# Patient Record
Sex: FEMALE | Race: WHITE | NOT HISPANIC OR LATINO | Employment: FULL TIME | URBAN - METROPOLITAN AREA
[De-identification: names, ages, dates, MRNs, and addresses within clinical notes are randomized per-mention and may not be internally consistent; named-entity substitution may affect disease eponyms.]

---

## 2024-05-09 ENCOUNTER — HOSPITAL ENCOUNTER (OUTPATIENT)
Dept: MRI IMAGING | Facility: HOSPITAL | Age: 59
Discharge: HOME | End: 2024-05-09
Admitting: PLASTIC SURGERY
Payer: COMMERCIAL

## 2024-05-09 DIAGNOSIS — Z90.13 STATUS POST BILATERAL MASTECTOMY: ICD-10-CM

## 2024-05-09 DIAGNOSIS — Z85.3 PERSONAL HISTORY OF BREAST CANCER: ICD-10-CM

## 2024-05-09 DIAGNOSIS — N64.9 DISORDER OF BREAST: ICD-10-CM

## 2024-05-09 LAB — CREAT BLDA-MCNC: 0.8 MG/DL (ref 0.6–1.3)

## 2024-05-09 PROCEDURE — 82565 ASSAY OF CREATININE: CPT

## 2024-05-09 PROCEDURE — A9577 INJ MULTIHANCE: HCPCS | Performed by: PLASTIC SURGERY

## 2024-05-09 PROCEDURE — 0 GADOBENATE DIMEGLUMINE 529 MG/ML SOLUTION: Performed by: PLASTIC SURGERY

## 2024-05-09 PROCEDURE — 77049 MRI BREAST C-+ W/CAD BI: CPT

## 2024-05-09 RX ADMIN — GADOBENATE DIMEGLUMINE 14 ML: 529 INJECTION, SOLUTION INTRAVENOUS at 08:43

## 2024-07-08 ENCOUNTER — PRE-ADMISSION TESTING (OUTPATIENT)
Dept: PREADMISSION TESTING | Facility: HOSPITAL | Age: 59
End: 2024-07-08

## 2024-07-08 VITALS — BODY MASS INDEX: 27.29 KG/M2 | WEIGHT: 163.8 LBS | HEIGHT: 65 IN

## 2024-07-08 LAB
ANION GAP SERPL CALCULATED.3IONS-SCNC: 6 MMOL/L (ref 5–15)
BUN SERPL-MCNC: 11 MG/DL (ref 8–23)
BUN/CREAT SERPL: 14.5 (ref 7–25)
CALCIUM SPEC-SCNC: 9.3 MG/DL (ref 8.6–10.5)
CHLORIDE SERPL-SCNC: 103 MMOL/L (ref 98–107)
CO2 SERPL-SCNC: 30 MMOL/L (ref 22–29)
CREAT SERPL-MCNC: 0.76 MG/DL (ref 0.57–1)
DEPRECATED RDW RBC AUTO: 45 FL (ref 37–54)
EGFRCR SERPLBLD CKD-EPI 2021: 89.8 ML/MIN/1.73
ERYTHROCYTE [DISTWIDTH] IN BLOOD BY AUTOMATED COUNT: 13.2 % (ref 12.3–15.4)
GLUCOSE SERPL-MCNC: 108 MG/DL (ref 65–99)
HBA1C MFR BLD: 5.2 % (ref 4.8–5.6)
HCT VFR BLD AUTO: 39.3 % (ref 34–46.6)
HGB BLD-MCNC: 12.6 G/DL (ref 12–15.9)
MCH RBC QN AUTO: 29.2 PG (ref 26.6–33)
MCHC RBC AUTO-ENTMCNC: 32.1 G/DL (ref 31.5–35.7)
MCV RBC AUTO: 91.2 FL (ref 79–97)
PLATELET # BLD AUTO: 268 10*3/MM3 (ref 140–450)
PMV BLD AUTO: 8.9 FL (ref 6–12)
POTASSIUM SERPL-SCNC: 4.8 MMOL/L (ref 3.5–5.2)
RBC # BLD AUTO: 4.31 10*6/MM3 (ref 3.77–5.28)
SODIUM SERPL-SCNC: 139 MMOL/L (ref 136–145)
WBC NRBC COR # BLD AUTO: 4.95 10*3/MM3 (ref 3.4–10.8)

## 2024-07-08 PROCEDURE — 83036 HEMOGLOBIN GLYCOSYLATED A1C: CPT

## 2024-07-08 PROCEDURE — 80048 BASIC METABOLIC PNL TOTAL CA: CPT

## 2024-07-08 PROCEDURE — 36415 COLL VENOUS BLD VENIPUNCTURE: CPT

## 2024-07-08 PROCEDURE — 85027 COMPLETE CBC AUTOMATED: CPT

## 2024-07-08 RX ORDER — ROSUVASTATIN CALCIUM 5 MG/1
5 TABLET, COATED ORAL DAILY
COMMUNITY

## 2024-07-08 RX ORDER — ACYCLOVIR 800 MG/1
800 TABLET ORAL 2 TIMES DAILY
COMMUNITY

## 2024-07-08 RX ORDER — ASPIRIN 81 MG/1
81 TABLET ORAL DAILY
COMMUNITY
End: 2024-07-15 | Stop reason: HOSPADM

## 2024-07-08 RX ORDER — IMIPRAMINE HCL 25 MG
50 TABLET ORAL NIGHTLY
COMMUNITY

## 2024-07-08 NOTE — PAT
Patient to apply Chlorhexadine wipes  to surgical area (as instructed) the night before procedure and the AM of procedure. Wipes provided.    Patient instructed to drink 20 ounces of Gatorade or Gatorlyte (if diabetic) and it needs to be completed 1 hour (for Main OR patients) or 2 hours (scheduled  section & [facility] patients) before given arrival time for procedure (NO RED Gatorade and NO Gatorade Zero).    Patient verbalized understanding.    Pt seeing cardiology later today for EKG and clearance letter- pt informed to fax and get personal copy for dos if dont receive in time- holding chart at this time till receive.

## 2024-07-14 ENCOUNTER — ANESTHESIA EVENT (OUTPATIENT)
Dept: PERIOP | Facility: HOSPITAL | Age: 59
End: 2024-07-14
Payer: COMMERCIAL

## 2024-07-14 RX ORDER — SODIUM CHLORIDE 9 MG/ML
40 INJECTION, SOLUTION INTRAVENOUS AS NEEDED
Status: CANCELLED | OUTPATIENT
Start: 2024-07-14

## 2024-07-14 RX ORDER — SODIUM CHLORIDE 0.9 % (FLUSH) 0.9 %
10 SYRINGE (ML) INJECTION AS NEEDED
Status: CANCELLED | OUTPATIENT
Start: 2024-07-14

## 2024-07-14 RX ORDER — FAMOTIDINE 10 MG/ML
20 INJECTION, SOLUTION INTRAVENOUS ONCE
Status: CANCELLED | OUTPATIENT
Start: 2024-07-14 | End: 2024-07-14

## 2024-07-15 ENCOUNTER — HOSPITAL ENCOUNTER (OUTPATIENT)
Facility: HOSPITAL | Age: 59
Setting detail: HOSPITAL OUTPATIENT SURGERY
Discharge: HOME | End: 2024-07-15
Attending: PLASTIC SURGERY | Admitting: PLASTIC SURGERY
Payer: COMMERCIAL

## 2024-07-15 ENCOUNTER — ANESTHESIA EVENT CONVERTED (OUTPATIENT)
Dept: ANESTHESIOLOGY | Facility: HOSPITAL | Age: 59
End: 2024-07-15
Payer: COMMERCIAL

## 2024-07-15 ENCOUNTER — ANESTHESIA (OUTPATIENT)
Dept: PERIOP | Facility: HOSPITAL | Age: 59
End: 2024-07-15
Payer: COMMERCIAL

## 2024-07-15 VITALS
SYSTOLIC BLOOD PRESSURE: 118 MMHG | TEMPERATURE: 97.7 F | DIASTOLIC BLOOD PRESSURE: 83 MMHG | RESPIRATION RATE: 18 BRPM | OXYGEN SATURATION: 97 % | HEART RATE: 82 BPM

## 2024-07-15 DIAGNOSIS — N64.89 ACQUIRED BREAST DEFORMITY: ICD-10-CM

## 2024-07-15 PROCEDURE — 25810000003 LACTATED RINGERS PER 1000 ML: Performed by: ANESTHESIOLOGY

## 2024-07-15 PROCEDURE — 25010000002 ONDANSETRON PER 1 MG: Performed by: NURSE ANESTHETIST, CERTIFIED REGISTERED

## 2024-07-15 PROCEDURE — C1789 PROSTHESIS, BREAST, IMP: HCPCS | Performed by: PLASTIC SURGERY

## 2024-07-15 PROCEDURE — 25010000002 FENTANYL CITRATE (PF) 50 MCG/ML SOLUTION: Performed by: NURSE ANESTHETIST, CERTIFIED REGISTERED

## 2024-07-15 PROCEDURE — 25010000002 BUPIVACAINE (PF) 0.25 % SOLUTION: Performed by: NURSE ANESTHETIST, CERTIFIED REGISTERED

## 2024-07-15 PROCEDURE — 25010000002 ALBUMIN HUMAN 5% PER 50 ML: Performed by: NURSE ANESTHETIST, CERTIFIED REGISTERED

## 2024-07-15 PROCEDURE — 25010000002 GENTAMICIN PER 80 MG: Performed by: PLASTIC SURGERY

## 2024-07-15 PROCEDURE — 25010000002 EPINEPHRINE PER 0.1 MG: Performed by: PLASTIC SURGERY

## 2024-07-15 PROCEDURE — 25010000002 SUGAMMADEX 200 MG/2ML SOLUTION: Performed by: NURSE ANESTHETIST, CERTIFIED REGISTERED

## 2024-07-15 PROCEDURE — 25010000002 FENTANYL CITRATE (PF) 100 MCG/2ML SOLUTION: Performed by: NURSE ANESTHETIST, CERTIFIED REGISTERED

## 2024-07-15 PROCEDURE — 25010000002 CEFAZOLIN PER 500 MG: Performed by: PLASTIC SURGERY

## 2024-07-15 PROCEDURE — 88302 TISSUE EXAM BY PATHOLOGIST: CPT | Performed by: PLASTIC SURGERY

## 2024-07-15 PROCEDURE — 25010000002 DEXAMETHASONE SODIUM PHOSPHATE 10 MG/ML SOLUTION: Performed by: NURSE ANESTHETIST, CERTIFIED REGISTERED

## 2024-07-15 PROCEDURE — 25010000002 PROPOFOL 10 MG/ML EMULSION: Performed by: NURSE ANESTHETIST, CERTIFIED REGISTERED

## 2024-07-15 PROCEDURE — 25010000002 CEFAZOLIN PER 500 MG: Performed by: NURSE ANESTHETIST, CERTIFIED REGISTERED

## 2024-07-15 PROCEDURE — P9041 ALBUMIN (HUMAN),5%, 50ML: HCPCS | Performed by: NURSE ANESTHETIST, CERTIFIED REGISTERED

## 2024-07-15 PROCEDURE — C1781 MESH (IMPLANTABLE): HCPCS | Performed by: PLASTIC SURGERY

## 2024-07-15 PROCEDURE — 25010000002 PHENYLEPHRINE 10 MG/ML SOLUTION 1 ML VIAL: Performed by: NURSE ANESTHETIST, CERTIFIED REGISTERED

## 2024-07-15 DEVICE — DEV CONTRL TISS STRATAFIX SPIRAL MNCRYL UD 3/0 PLS 60CM: Type: IMPLANTABLE DEVICE | Site: BREAST | Status: FUNCTIONAL

## 2024-07-15 DEVICE — IMPLANTABLE DEVICE: Type: IMPLANTABLE DEVICE | Site: BREAST | Status: FUNCTIONAL

## 2024-07-15 DEVICE — PHASIX™ MESH
Type: IMPLANTABLE DEVICE | Site: BREAST | Status: FUNCTIONAL
Brand: PHASIX MESH

## 2024-07-15 DEVICE — DEV CONTRL TISS STRATAFIX SPIRAL MNCRYL CT2 2/0 30CM: Type: IMPLANTABLE DEVICE | Site: BREAST | Status: FUNCTIONAL

## 2024-07-15 RX ORDER — DEXAMETHASONE SODIUM PHOSPHATE 10 MG/ML
INJECTION, SOLUTION INTRAMUSCULAR; INTRAVENOUS
Status: COMPLETED | OUTPATIENT
Start: 2024-07-15 | End: 2024-07-15

## 2024-07-15 RX ORDER — HYDROMORPHONE HYDROCHLORIDE 1 MG/ML
0.5 INJECTION, SOLUTION INTRAMUSCULAR; INTRAVENOUS; SUBCUTANEOUS
Status: DISCONTINUED | OUTPATIENT
Start: 2024-07-15 | End: 2024-07-15 | Stop reason: HOSPADM

## 2024-07-15 RX ORDER — FENTANYL CITRATE 50 UG/ML
INJECTION, SOLUTION INTRAMUSCULAR; INTRAVENOUS
Status: DISCONTINUED
Start: 2024-07-15 | End: 2024-07-15 | Stop reason: HOSPADM

## 2024-07-15 RX ORDER — DROPERIDOL 2.5 MG/ML
0.62 INJECTION, SOLUTION INTRAMUSCULAR; INTRAVENOUS
Status: DISCONTINUED | OUTPATIENT
Start: 2024-07-15 | End: 2024-07-15 | Stop reason: HOSPADM

## 2024-07-15 RX ORDER — SODIUM CHLORIDE 9 MG/ML
40 INJECTION, SOLUTION INTRAVENOUS AS NEEDED
Status: DISCONTINUED | OUTPATIENT
Start: 2024-07-15 | End: 2024-07-15 | Stop reason: HOSPADM

## 2024-07-15 RX ORDER — ONDANSETRON 2 MG/ML
INJECTION INTRAMUSCULAR; INTRAVENOUS AS NEEDED
Status: DISCONTINUED | OUTPATIENT
Start: 2024-07-15 | End: 2024-07-15 | Stop reason: SURG

## 2024-07-15 RX ORDER — ONDANSETRON 2 MG/ML
4 INJECTION INTRAMUSCULAR; INTRAVENOUS ONCE AS NEEDED
Status: DISCONTINUED | OUTPATIENT
Start: 2024-07-15 | End: 2024-07-15 | Stop reason: HOSPADM

## 2024-07-15 RX ORDER — CEFAZOLIN SODIUM 1 G/3ML
INJECTION, POWDER, FOR SOLUTION INTRAMUSCULAR; INTRAVENOUS AS NEEDED
Status: DISCONTINUED | OUTPATIENT
Start: 2024-07-15 | End: 2024-07-15 | Stop reason: SURG

## 2024-07-15 RX ORDER — DEXAMETHASONE SODIUM PHOSPHATE 10 MG/ML
INJECTION, SOLUTION INTRAMUSCULAR; INTRAVENOUS AS NEEDED
Status: DISCONTINUED | OUTPATIENT
Start: 2024-07-15 | End: 2024-07-15 | Stop reason: SURG

## 2024-07-15 RX ORDER — IPRATROPIUM BROMIDE AND ALBUTEROL SULFATE 2.5; .5 MG/3ML; MG/3ML
3 SOLUTION RESPIRATORY (INHALATION) ONCE AS NEEDED
Status: DISCONTINUED | OUTPATIENT
Start: 2024-07-15 | End: 2024-07-15 | Stop reason: HOSPADM

## 2024-07-15 RX ORDER — SODIUM CHLORIDE 0.9 % (FLUSH) 0.9 %
10 SYRINGE (ML) INJECTION EVERY 12 HOURS SCHEDULED
Status: DISCONTINUED | OUTPATIENT
Start: 2024-07-15 | End: 2024-07-15 | Stop reason: HOSPADM

## 2024-07-15 RX ORDER — PROPOFOL 10 MG/ML
VIAL (ML) INTRAVENOUS AS NEEDED
Status: DISCONTINUED | OUTPATIENT
Start: 2024-07-15 | End: 2024-07-15 | Stop reason: SURG

## 2024-07-15 RX ORDER — PROMETHAZINE HYDROCHLORIDE 25 MG/1
25 TABLET ORAL ONCE AS NEEDED
Status: DISCONTINUED | OUTPATIENT
Start: 2024-07-15 | End: 2024-07-15 | Stop reason: HOSPADM

## 2024-07-15 RX ORDER — ALBUMIN, HUMAN INJ 5% 5 %
SOLUTION INTRAVENOUS CONTINUOUS PRN
Status: DISCONTINUED | OUTPATIENT
Start: 2024-07-15 | End: 2024-07-15 | Stop reason: SURG

## 2024-07-15 RX ORDER — NALOXONE HCL 0.4 MG/ML
0.4 VIAL (ML) INJECTION AS NEEDED
Status: DISCONTINUED | OUTPATIENT
Start: 2024-07-15 | End: 2024-07-15 | Stop reason: HOSPADM

## 2024-07-15 RX ORDER — SODIUM CHLORIDE, SODIUM LACTATE, POTASSIUM CHLORIDE, CALCIUM CHLORIDE 600; 310; 30; 20 MG/100ML; MG/100ML; MG/100ML; MG/100ML
9 INJECTION, SOLUTION INTRAVENOUS CONTINUOUS
Status: DISCONTINUED | OUTPATIENT
Start: 2024-07-15 | End: 2024-07-15 | Stop reason: HOSPADM

## 2024-07-15 RX ORDER — SCOLOPAMINE TRANSDERMAL SYSTEM 1 MG/1
1 PATCH, EXTENDED RELEASE TRANSDERMAL ONCE
Status: DISCONTINUED | OUTPATIENT
Start: 2024-07-15 | End: 2024-07-15 | Stop reason: HOSPADM

## 2024-07-15 RX ORDER — MIDAZOLAM HYDROCHLORIDE 1 MG/ML
1 INJECTION INTRAMUSCULAR; INTRAVENOUS
Status: DISCONTINUED | OUTPATIENT
Start: 2024-07-15 | End: 2024-07-15 | Stop reason: HOSPADM

## 2024-07-15 RX ORDER — FENTANYL CITRATE 50 UG/ML
INJECTION, SOLUTION INTRAMUSCULAR; INTRAVENOUS AS NEEDED
Status: DISCONTINUED | OUTPATIENT
Start: 2024-07-15 | End: 2024-07-15 | Stop reason: SURG

## 2024-07-15 RX ORDER — FAMOTIDINE 20 MG/1
20 TABLET, FILM COATED ORAL ONCE
Status: COMPLETED | OUTPATIENT
Start: 2024-07-15 | End: 2024-07-15

## 2024-07-15 RX ORDER — LABETALOL HYDROCHLORIDE 5 MG/ML
5 INJECTION, SOLUTION INTRAVENOUS
Status: DISCONTINUED | OUTPATIENT
Start: 2024-07-15 | End: 2024-07-15 | Stop reason: HOSPADM

## 2024-07-15 RX ORDER — SODIUM CHLORIDE 0.9 % (FLUSH) 0.9 %
3 SYRINGE (ML) INJECTION EVERY 12 HOURS SCHEDULED
Status: DISCONTINUED | OUTPATIENT
Start: 2024-07-15 | End: 2024-07-15 | Stop reason: HOSPADM

## 2024-07-15 RX ORDER — SODIUM CHLORIDE 0.9 % (FLUSH) 0.9 %
3-10 SYRINGE (ML) INJECTION AS NEEDED
Status: DISCONTINUED | OUTPATIENT
Start: 2024-07-15 | End: 2024-07-15 | Stop reason: HOSPADM

## 2024-07-15 RX ORDER — PROMETHAZINE HYDROCHLORIDE 25 MG/1
25 SUPPOSITORY RECTAL ONCE AS NEEDED
Status: DISCONTINUED | OUTPATIENT
Start: 2024-07-15 | End: 2024-07-15 | Stop reason: HOSPADM

## 2024-07-15 RX ORDER — LIDOCAINE HYDROCHLORIDE 10 MG/ML
0.5 INJECTION, SOLUTION EPIDURAL; INFILTRATION; INTRACAUDAL; PERINEURAL ONCE AS NEEDED
Status: COMPLETED | OUTPATIENT
Start: 2024-07-15 | End: 2024-07-15

## 2024-07-15 RX ORDER — FENTANYL CITRATE 50 UG/ML
50 INJECTION, SOLUTION INTRAMUSCULAR; INTRAVENOUS
Status: COMPLETED | OUTPATIENT
Start: 2024-07-15 | End: 2024-07-15

## 2024-07-15 RX ORDER — MAGNESIUM HYDROXIDE 1200 MG/15ML
LIQUID ORAL AS NEEDED
Status: DISCONTINUED | OUTPATIENT
Start: 2024-07-15 | End: 2024-07-15 | Stop reason: HOSPADM

## 2024-07-15 RX ORDER — HYDRALAZINE HYDROCHLORIDE 20 MG/ML
5 INJECTION INTRAMUSCULAR; INTRAVENOUS
Status: DISCONTINUED | OUTPATIENT
Start: 2024-07-15 | End: 2024-07-15 | Stop reason: HOSPADM

## 2024-07-15 RX ORDER — PHENYLEPHRINE HCL IN 0.9% NACL 1 MG/10 ML
SYRINGE (ML) INTRAVENOUS AS NEEDED
Status: DISCONTINUED | OUTPATIENT
Start: 2024-07-15 | End: 2024-07-15 | Stop reason: SURG

## 2024-07-15 RX ORDER — EPHEDRINE SULFATE 50 MG/ML
INJECTION INTRAVENOUS AS NEEDED
Status: DISCONTINUED | OUTPATIENT
Start: 2024-07-15 | End: 2024-07-15 | Stop reason: SURG

## 2024-07-15 RX ORDER — DROPERIDOL 2.5 MG/ML
0.62 INJECTION, SOLUTION INTRAMUSCULAR; INTRAVENOUS ONCE AS NEEDED
Status: DISCONTINUED | OUTPATIENT
Start: 2024-07-15 | End: 2024-07-15 | Stop reason: HOSPADM

## 2024-07-15 RX ORDER — ROCURONIUM BROMIDE 10 MG/ML
INJECTION, SOLUTION INTRAVENOUS AS NEEDED
Status: DISCONTINUED | OUTPATIENT
Start: 2024-07-15 | End: 2024-07-15 | Stop reason: SURG

## 2024-07-15 RX ORDER — BUPIVACAINE HYDROCHLORIDE 2.5 MG/ML
INJECTION, SOLUTION EPIDURAL; INFILTRATION; INTRACAUDAL
Status: COMPLETED | OUTPATIENT
Start: 2024-07-15 | End: 2024-07-15

## 2024-07-15 RX ADMIN — Medication 100 MCG: at 09:29

## 2024-07-15 RX ADMIN — Medication 100 MCG: at 11:13

## 2024-07-15 RX ADMIN — FENTANYL CITRATE 50 MCG: 50 INJECTION, SOLUTION INTRAMUSCULAR; INTRAVENOUS at 15:12

## 2024-07-15 RX ADMIN — FENTANYL CITRATE 50 MCG: 50 INJECTION, SOLUTION INTRAMUSCULAR; INTRAVENOUS at 15:29

## 2024-07-15 RX ADMIN — Medication 100 MCG: at 08:36

## 2024-07-15 RX ADMIN — PHENYLEPHRINE HYDROCHLORIDE 0.2 MCG/KG/MIN: 10 INJECTION INTRAVENOUS at 11:38

## 2024-07-15 RX ADMIN — ALBUMIN (HUMAN): 12.5 INJECTION, SOLUTION INTRAVENOUS at 11:22

## 2024-07-15 RX ADMIN — SCOPOLAMINE 1 PATCH: 1.5 PATCH, EXTENDED RELEASE TRANSDERMAL at 07:01

## 2024-07-15 RX ADMIN — SODIUM CHLORIDE, POTASSIUM CHLORIDE, SODIUM LACTATE AND CALCIUM CHLORIDE 9 ML/HR: 600; 310; 30; 20 INJECTION, SOLUTION INTRAVENOUS at 06:53

## 2024-07-15 RX ADMIN — FAMOTIDINE 20 MG: 20 TABLET, FILM COATED ORAL at 06:53

## 2024-07-15 RX ADMIN — EPHEDRINE SULFATE 10 MG: 50 INJECTION INTRAVENOUS at 11:29

## 2024-07-15 RX ADMIN — PROPOFOL 150 MG: 10 INJECTION, EMULSION INTRAVENOUS at 07:38

## 2024-07-15 RX ADMIN — ROCURONIUM BROMIDE 10 MG: 10 INJECTION INTRAVENOUS at 13:35

## 2024-07-15 RX ADMIN — FENTANYL CITRATE 25 MCG: 50 INJECTION, SOLUTION INTRAMUSCULAR; INTRAVENOUS at 13:21

## 2024-07-15 RX ADMIN — EPHEDRINE SULFATE 10 MG: 50 INJECTION INTRAVENOUS at 14:07

## 2024-07-15 RX ADMIN — DEXAMETHASONE SODIUM PHOSPHATE 6 MG: 10 INJECTION, SOLUTION INTRAMUSCULAR; INTRAVENOUS at 07:45

## 2024-07-15 RX ADMIN — ONDANSETRON 4 MG: 2 INJECTION INTRAMUSCULAR; INTRAVENOUS at 13:54

## 2024-07-15 RX ADMIN — LIDOCAINE HYDROCHLORIDE 0.5 ML: 10 INJECTION, SOLUTION EPIDURAL; INFILTRATION; INTRACAUDAL; PERINEURAL at 06:52

## 2024-07-15 RX ADMIN — FENTANYL CITRATE 50 MCG: 50 INJECTION, SOLUTION INTRAMUSCULAR; INTRAVENOUS at 07:38

## 2024-07-15 RX ADMIN — Medication 100 MCG: at 11:03

## 2024-07-15 RX ADMIN — ALBUMIN (HUMAN): 12.5 INJECTION, SOLUTION INTRAVENOUS at 12:16

## 2024-07-15 RX ADMIN — CEFAZOLIN 2 G: 1 INJECTION, POWDER, FOR SOLUTION INTRAMUSCULAR; INTRAVENOUS at 13:15

## 2024-07-15 RX ADMIN — BUPIVACAINE HYDROCHLORIDE 60 ML: 2.5 INJECTION, SOLUTION EPIDURAL; INFILTRATION; INTRACAUDAL; PERINEURAL at 07:42

## 2024-07-15 RX ADMIN — FENTANYL CITRATE 50 MCG: 50 INJECTION, SOLUTION INTRAMUSCULAR; INTRAVENOUS at 15:48

## 2024-07-15 RX ADMIN — ROCURONIUM BROMIDE 20 MG: 10 INJECTION INTRAVENOUS at 08:43

## 2024-07-15 RX ADMIN — EPHEDRINE SULFATE 10 MG: 50 INJECTION INTRAVENOUS at 14:17

## 2024-07-15 RX ADMIN — ROCURONIUM BROMIDE 10 MG: 10 INJECTION INTRAVENOUS at 12:26

## 2024-07-15 RX ADMIN — CEFAZOLIN 2 G: 1 INJECTION, POWDER, FOR SOLUTION INTRAMUSCULAR; INTRAVENOUS at 10:23

## 2024-07-15 RX ADMIN — ROCURONIUM BROMIDE 50 MG: 10 INJECTION INTRAVENOUS at 07:38

## 2024-07-15 RX ADMIN — SODIUM CHLORIDE, POTASSIUM CHLORIDE, SODIUM LACTATE AND CALCIUM CHLORIDE: 600; 310; 30; 20 INJECTION, SOLUTION INTRAVENOUS at 14:17

## 2024-07-15 RX ADMIN — SODIUM CHLORIDE 2000 MG: 900 INJECTION INTRAVENOUS at 07:30

## 2024-07-15 RX ADMIN — DEXAMETHASONE SODIUM PHOSPHATE 4 MG: 10 INJECTION, SOLUTION INTRAMUSCULAR; INTRAVENOUS at 07:42

## 2024-07-15 RX ADMIN — ROCURONIUM BROMIDE 20 MG: 10 INJECTION INTRAVENOUS at 11:02

## 2024-07-15 RX ADMIN — FENTANYL CITRATE 50 MCG: 50 INJECTION, SOLUTION INTRAMUSCULAR; INTRAVENOUS at 16:12

## 2024-07-15 RX ADMIN — PROPOFOL 25 MCG/KG/MIN: 10 INJECTION, EMULSION INTRAVENOUS at 07:45

## 2024-07-15 RX ADMIN — Medication 100 MCG: at 10:32

## 2024-07-15 RX ADMIN — FENTANYL CITRATE 25 MCG: 50 INJECTION, SOLUTION INTRAMUSCULAR; INTRAVENOUS at 11:03

## 2024-07-15 RX ADMIN — SUGAMMADEX 200 MG: 100 INJECTION, SOLUTION INTRAVENOUS at 14:08

## 2024-07-15 RX ADMIN — SODIUM CHLORIDE, POTASSIUM CHLORIDE, SODIUM LACTATE AND CALCIUM CHLORIDE: 600; 310; 30; 20 INJECTION, SOLUTION INTRAVENOUS at 10:58

## 2024-07-15 RX ADMIN — PROPOFOL 25 MCG/KG/MIN: 10 INJECTION, EMULSION INTRAVENOUS at 11:00

## 2024-07-15 RX ADMIN — Medication 100 MCG: at 11:23

## 2024-07-15 NOTE — ANESTHESIA PROCEDURE NOTES
"Peripheral Block      Patient reassessed immediately prior to procedure    Patient location during procedure: OR  Reason for block: at surgeon's request and post-op pain management  Performed by  CRNA/CAA: Jasson Rahman, ALEXANDER  Preanesthetic Checklist  Completed: patient identified, IV checked, site marked, risks and benefits discussed, surgical consent, monitors and equipment checked, pre-op evaluation and timeout performed  Prep:  Pt Position: supine  Sterile barriers:cap, gloves, mask and washed/disinfected hands  Prep: ChloraPrep  Patient monitoring: blood pressure monitoring, continuous pulse oximetry and EKG  Procedure    Sedation: yes  Performed under: general  Guidance:ultrasound guided  Images:still images obtained, printed/placed on chart    Laterality:Bilateral  Block Type:TAP  Injection Technique:single-shot  Needle Type:short-bevel and echogenic  Needle Gauge:20 G  Resistance on Injection: none    Medications Used: dexamethasone sodium phosphate injection - Injection   4 mg - 7/15/2024 7:42:00 AM  bupivacaine PF (MARCAINE) 0.25 % injection - Injection   60 mL - 7/15/2024 7:42:00 AM      Medications  Comment:Block Injection:  LA dose divided between Right and Left block        Post Assessment  Injection Assessment: negative aspiration for heme, incremental injection and no paresthesia on injection  Patient Tolerance:comfortable throughout block  Complications:no  Additional Notes    Mid-Axillary/Lateral TAPs    A high-frequency linear transducer, with sterile cover, was placed in the midaxillary line between the ASIS and costal margin. The External Oblique Muscle (EOM), Internal Oblique Muscle (IOM), Transverse Abdominus Muscle (CRUZ), and Peritoneum were identified. The insertion site was prepped in sterile fashion and then localized with 2-5 ml of 1% Lidocaine. Using ultrasound-guidance, a 20-gauge B-Higgins 4\" Ultraplex 360 non-stimulating echogenic needle was advanced in plane, from medial to lateral, " until the tip of the needle was in the fascial plane between the IOM and CRUZ. 1-3ml of preservative free normal saline was used to hydro-dissect the fascial planes. After the fascial plane was verified, the local anesthetic (LA) was injected. The procedure was repeated on the opposite side for bilateral coverage. Aspiration every 5 ml to prevent intravascular injection. Injection was completed with negative aspiration of blood and negative intravascular injection. Injection pressures were normal with minimal resistance. Midaxillary TAPs should reach intercostal nerves T10- T11 and the subcostal nerve T12.    Performed by: Shannon Pérez CRNA

## 2024-07-15 NOTE — ANESTHESIA PROCEDURE NOTES
Airway  Urgency: elective    Date/Time: 7/15/2024 7:40 AM  Airway not difficult    General Information and Staff    Patient location during procedure: OR  CRNA/CAA: Shannon Pérez CRNA    Indications and Patient Condition  Indications for airway management: airway protection    Preoxygenated: yes  MILS not maintained throughout  Mask difficulty assessment: 2 - vent by mask + OA or adjuvant +/- NMBA    Final Airway Details  Final airway type: endotracheal airway      Successful airway: ETT  Cuffed: yes   Successful intubation technique: video laryngoscopy  Facilitating devices/methods: intubating stylet  Endotracheal tube insertion site: oral  Blade: Packer  Blade size: 3  ETT size (mm): 7.0  Cormack-Lehane Classification: grade I - full view of glottis  Placement verified by: chest auscultation and capnometry   Measured from: lips  ETT/EBT  to lips (cm): 20  Number of attempts at approach: 1  Assessment: lips, teeth, and gum same as pre-op and atraumatic intubation    Additional Comments  Negative epigastric sounds, Breath sound equal bilaterally with symmetric chest rise and fall    Elective use of packer

## 2024-07-15 NOTE — OP NOTE
Patient Name:  Amanda Chavez  :  1965  MRN:  [id number]    Date of Surgery:  7/15/2024    Pre Operative Diagnosis: acquired absent breast and nipple and excess abdominal skin  Post Operative Diagnosis:   acquired absent breast and nipple and excess abdominal skin    PROCEDURES PERFORMED:  Removal of bilateral saline implants  Bilateral lateral breast pocket suture and popcorn capsulorrhaphy   Bilateral superior medial to superior lateral open breast capsulotomy  Left breast inferior medial open breast capsulotomy with more submuscular muscle release  Bilateral lateral breast pocket Phasix mesh placement   Bilateral breast skin and subcutaneous tissue removal extending out to the axilla. (Rt 19x5 cm and Lt 17x4 cm for a total of 36 cm in length)  Placement of bilateral Allergan Smooth silicone implant (Rt SSX-375, Lt SSX-420)  Extended Abdominoplasty with abdominal flank liposuction.     Implants Allergan Smooth Silicone  Rt breast implant: SSX-375,  Lt breast implant: SSX-420,    SURGEON: Felipe Ledesma MD      CPT® Codes:         Staff:  Surgeon(s):  Felipe Ledesma MD    Circulator: Emil Rubalcava RN; Shirley Stephens RN  Physician Assistant: Nilsa Yancey PA  Scrub Person: Rodolfo Davison; William Lucero  Nursing Assistant: Leena Gan           Anesthesia: General      Indications for the Procedure:  Amanda Chavez is a 59yoF nonsmoker hx of heart murmur, breast cancer with treatment and breast reconstruction with bilateral implant malposition and excess abdominal skin. Patient was found to have implants laterally displaced with excess skin around her breast. Patient was found appropriate for a bilateral breast revision with implant replacement. Patient also was interested in getting rid of extra abdominal skin. Patient was found appropriate as well for an abdominoplasty.     Operative Findings:  Bilateral saline implants intact. Lateral displacement of both saline implants Left side greater than right.  Breast excess skin on the breast extending out laterally.     Description of the Procedure:      Patient was seen in preop risks and benefits were discussed with the patient who elected to proceed with the procedure. H&P was updated, consent was obtained, and patient was marked appropriately. Patient was then taken back to the OR placed in a supine position. Anesthesia was induced and the patient was then intubated. The patient was the prepped and draped in a sterile fashion. A time out was performed in which the patient and the procedure were gone over. Everyone was in agreement in terms of the patient and the procedure.       Starting on the right breast. A marker was used to draw out an IMF incision 5 cm in length. Local was placed at the site of planned incision. A 15 blade was used to make an incision through the epidermis and dermis followed by bovie cautery down to the tissue expander. The saline implant was noted to be intact and lateral displaced. A decanter hooked to suction was used to deflate the saline implant. The saline implant was removed. The pocket was irrigated. The breast pocket was noted to be extensive laterally extending down to her axilla. Lateral breast pocket popcorn and suture capsulorrhaphy was performed with 2-0 silk suture. The right breast pocket superior medial to superior lateral open breast capsulotomies were performed. The sizer 375 cc was then placed again. Content with the shape move on to the right breast.     Attention then went to the left breast. A marker was used to draw out an IMF incision 5 cm in length. Local was placed at the site of planned incision. A 15 blade was used to make an incision through the epidermis and dermis followed by bovie cautery down to the tissue expander. The saline implant was noted to be intact and lateral displaced. A decanter hooked to suction was used to deflate the saline implant. The saline implant was removed. The pocket was irrigated. The  breast pocket was noted to be extensive laterally extending down to her axilla. Lateral breast pocket popcorn and suture capsulorrhaphy was performed with 2-0 silk suture. The left breast pocket superior medial to superior lateral open breast capsulotomies were performed as well as inferior medial open breast capsulotomy with some further muscle release. The sizer 420 cc was then placed again. Content with the shape move on to the left breast.    Two pick-ups and a stapler were used to tailor tack the excess skin along bilateral breast excess skin and laterally using the previous mastectomy and breast revision scars to include in the tailoring. Once that was complete the patient was setup. Content with the size and position the patient was laid back down.     A marker was used to kelley out the tailor marks bilaterally. A hemostat was used to remove the staples and kelley out the pattern. The pattern was then scored using a 10 blade. The pattern was excised skin and subcutaneous tissue using a 15 blade and bovie cautery. Hemostasis was obtained. The incisions bilaterally measured a total length of 36 cm of skin and subcutaneous tissue excised.  The tissue was sent to pathology for routine examination. The incision was closed laterally deep 2-0 vicryl suture followed by 3-0 monocryl and In-sorb stapler. The skin was closed with a running strattafix suture.     Bilateral sizers were then removed. The pockets were irrigated and hemostasis was noted. Phasix mesh was then selected and placed into bilateral lateral breast pockets to support the lateral wall of the breast pocket. The mesh was sutured in place with 2-0 vicryl mesh. The pockets were irrigated with betadine solution. 2-0 vicryl sutures were placed to close the pockets down but not tied down. An implant SSX-375 was selected for the right breast and SSX-420 was selected for the left breast. Top gloves were changed and the implant was placed into the breast pockets  using reyes funnel. The 2-0 vicryl sutures was then tied down closing the pockets. The IMF incisions were then further closed with 3-0 monocryl followed by a running strattafix.    Attention then went to the cosmetic surgery portion of the case. Anesthesia was notified of the change. A marker was used to draw out the planned lower abdominal incision 6 cm superior to the vaginal vestibule. A ruler was used to check symmetry. Local was then placed along the incision. A 10 blade was used to score the patten      Attention then went to liposuction component. Poke holes were placed laterally using a 15 blade. A wetting solution was then placed in the lateral flanks a total of 500 cc bilaterally. A 4.6 mm cannula was then used to perform separation of fat followed by fat aspiration. 250 cc was taken from bilateral flanks for a total of 500 cc of liposuction. A 4.6 mm cannula was then used to perform fat equalization.       A bovie cautery was then used to further dissect the abdominal incision down to the anterior abdominal wall. Dissection continued in a superior direction up to the umbilicus. At the umbilicus a marker was used to kelley out the planned umbilicus. A 11 blade was used to outline the umbilicus. Tenotomy scissors were used to better define the umbilicus. A 10-blade followed by bovie cautery was used to split the flap. Dissection continued around the umbilicus and up centrally to the xiphoid. The abdomen was irrigated, and hemostasis noted. The abdominal plication was marked with a marker. 0 ethibond figure of 8 stitches were used to plicate followed by 1-0 Ethilon suture. The mons was then tacked into position with 2-0 vicryl suture. The patient was then placed in beach chair flexed position. Progressive tension sutures were then placed with 2-0 vicryl. One drain was then placed and sutured into position with 3-0 nylon. The flaps were brought down and marked for excision. Local was placed along the incision.  The planned incision was scored using a 10 blade followed by bovie cautery. The skin flaps were then excised. Hemostasis was noted. The skin flaps were then tailored tacked into position using a stapler. The incision was closed with deep 2-0 vicryl suture followed by In-sorb stapler and a running 3-0 strattafix suture. A marker was used prior to closure to kelley the new umbilicus position. A 15 blade was used to make an ellipse incision where the new umbilicus has been marked. Tenotomy scissor were then used to dissect out the umbilicus. The umbilicus was pulled into position. 3-0 monocryls were then used to tack the umbilicus into position followed by 5-0 monocryl suture.      Xeofin was then placed along the incision. Dressing followed by an abdominal binder were placed.      At this point in time the procedure had finished. Patient was then extubated and taken to PACU for full recovery      Implants:    Implant Name Type Inv. Item Serial No.  Lot No. LRB No. Used Action   DEV CONTRL TISS STRATAFIX SPIRAL MNCRYL UD 3/0 PLS 60CM - [id number] Implant DEV CONTRL TISS STRATAFIX SPIRAL MNCRYL UD 3/0 PLS 60CM  [employer]  DIV OF J AND J [id number] Left 2 Implanted   DEV CONTRL TISS STRATAFIX SPIRAL MNCRYL UD 3/0 PLS 60CM - [id number] Implant DEV CONTRL TISS STRATAFIX SPIRAL MNCRYL UD 3/0 PLS 60CM  [employer]  DIV OF J AND J [id number] Right 4 Implanted   MESH ALAYNA PHASIX RECTG 8X16CM - [id number] Implant MESH ALAYNA PHASIX RECTG 8X16CM  DAVOL  (DIV OF CR BARD CO) [id number] Right 1 Implanted   MESH ALAYNA PHASIX RECTG 8X16CM - [id number] Implant MESH ALAYNA PHASIX RECTG 8X16CM  DAVOL  (DIV OF CR BARD CO) [id number] Left 1 Implanted   BRST GEL NATRELLE INSPIRA SFT TCH XF/P 420CC - [id number] Implant BRST GEL NATRELLE INSPIRA SFT TCH XF/P 420CC  ALLERGAN FRMLY [employer] [id number] Left 1 Implanted   BRST GEL NATRELLE INSPIRA SFT TC X/FUL/PROF 375CC - [id number] [facility] NILO CONTRERAS  X/FUL/PROF 375CC  KAYLEN VIEYRA [employer]HET[employer] [id number] Left 1 Implanted   DEV CONTRL TISS STRATAFIX SPIRAL MNCRYL CT2 2/0 30CM - [id number] Implant DEV CONTRL TISS STRATAFIX SPIRAL MNCRYL CT2 2/0 30CM  [employer]  DIV OF J AND J [id number] Left 1 Implanted   DEV CONTRL TISS STRATAFIX SPIRAL MNCRYL CT2 2/0 30CM - [id number] Implant DEV CONTRL TISS STRATAFIX SPIRAL MNCRYL CT2 2/0 30CM  [employer]  DIV OF J AND J [id number] Right 1 Implanted       Specimen Removed: Rt and Lt breast tissue      Specimens       ID Source Type Tests Collected By Collected At Frozen?    A Breast, Right Tissue TISSUE PATHOLOGY EXAM   Felipe Ledesma MD 7/15/24 0919     Description: right breast tissue    B Breast, Left Tissue TISSUE PATHOLOGY EXAM   Felipe Ledesma MD 7/15/24 0935     Description: left breast tissue            Estimated Blood Loss: 100ml    Drains Placed: One abdominal drain    Complications: none immediate      Felipe Ledesma MD     Date: 7/15/2024  Time: 14:48 EDT

## 2024-07-15 NOTE — H&P
Plastic Surgery Consult      Admission Date:  7/15/2024  LOS:  0  Patient Care Team:  Gauri Grimes MD as PCP - General (Family Medicine)  Jasmyne Ferris APRN as PCP - Internal Medicine (Nurse Practitioner)    Patient Name:  Amanda Chavez  :  1965  MRN:  [id number]    Date:  7/15/2024      Plastic Surgery Consult    Chief Complaint:    HPI:  Amanda Chavez is a 59yoF nonsmoker hx of heart murmur, breast cancer with treatment and breast reconstruction with bilateral implant malposition and excess abdominal skin.    Patient states hx of breast cancer treated with bilateral mastectomies. Patient did not have to have radiation. Patient underwent bilateral breast reconstruction with tissue expanders and then finally to implants. Patient has saline implant submuscular. Patient states pain on the sides bilaterally Rt>Lt. Patient has not had imaging. Patient does not wear a bra. Patient states soreness deep where the implants rub.    Patient also interested in an abdominoplasty. Patient states has had a tubal no other belly surgeries. No hernias. Patient states two children vaginal no complication. Patient interested in improved contour.    Patient was seen in clinic and found appropriate for removal and replacement of implants with capsule revision for displaced implants and an abdominoplasty.       placement of bilateral TE  10/12 Removal of TE and placement of [place] silicone gel implants   Removal of breast implant and replacement with saline implants   NAC recon,   Nipple revision and tattooing   Nipple revision and tattooing    Breast Implants  Lt breast: mentor [phone] filled to 400 cc (11.7, 5.2 to 11.4, 6.4 cm)  Rt breast: mentor [phone] filled to 350 cc (11.3, 4.8 to 11.0, 6.2 cm)    PMH: ROMEO, migraines, HLD  PSH: breast reconstruction, mastectomies, hysterectomy    SH: Nonsmoker Denies drinking, no illicit drug abuse. Lives [facility].  FH: No family history  blood clots or bleeding disorders  Allergies: NKDA  Medications: no blood thinners or steroids        History:   Past Medical History:   Diagnosis Date    Aortic valve insufficiency     Aortic valve regurgitation     Cancer     breast    Headache     Heart murmur     Migraine     Wears glasses      Past Surgical History:   Procedure Laterality Date    BREAST RECONSTRUCTION Bilateral     COLONOSCOPY      ENDOSCOPY      HYSTERECTOMY      total    MASTECTOMY Bilateral     WISDOM TOOTH EXTRACTION       History reviewed. No pertinent family history.  Social History     Socioeconomic History    Marital status:    Tobacco Use    Smoking status: Never    Smokeless tobacco: Never   Vaping Use    Vaping status: Never Used   Substance and Sexual Activity    Alcohol use: Yes     Alcohol/week: 4.0 standard drinks of alcohol     Types: 4 Cans of beer per week    Drug use: Never     No Known Allergies    Medication:    Current Facility-Administered Medications:     ceFAZolin 2000 mg IVPB in 100 mL NS (MBP), 2,000 mg, Intravenous, Once, Felipe Ledesma MD    lactated ringers infusion, 9 mL/hr, Intravenous, Continuous, Sascha Torres MD, Last Rate: 9 mL/hr at 07/15/24 0653, 9 mL/hr at 07/15/24 0653    midazolam (VERSED) injection 1 mg, 1 mg, Intravenous, Q10 Min PRN, Sascha Torres MD    scopolamine patch 1 mg/72 hr, 1 patch, Transdermal, Once, Sascha Torres MD, 1 patch at 07/15/24 0701    sodium chloride 0.9 % flush 10 mL, 10 mL, Intravenous, Q12H, Sascha Torres MD    Antibiotics:  Anti-Infectives (From admission, onward)      Ordered     Dose/Rate Route Frequency Start Stop    07/15/24 0609  ceFAZolin 2000 mg IVPB in 100 mL NS (MBP)        Ordering Provider: Felipe Ledesma MD    2,000 mg  over 30 Minutes Intravenous Once 07/15/24 0611              No Known Allergies       Review of Systems:  Constitutional-- No Fever, chills or sweats.  Appetite okay, and no malaise. No fatigue.  HEENT-- No new vision, hearing or  throat complaints.  No epistaxis or oral sores.  Denies odynophagia or dysphagia. No headache, photophobia or neck stiffness.  CV-- No chest pain, palpitation or syncope  Resp-- No SOB/cough/Hemoptysis  GI- No nausea, vomiting, or diarrhea.  No hematochezia, melena, or hematemesis. Denies jaundice or chronic liver disease.  -- No dysuria, hematuria, or flank pain.  Denies hesitancy, urgency or flank pain.  Lymph- no swollen lymph nodes in neck/axilla or groin.   Heme- No active bruising or bleeding; no Hx of DVT or PE.  MS-- no swelling or pain in the bones or joints of arms/legs.  No new back pain.  He does not verbalize pain per sister at bedside.  Neuro-- No acute focal weakness or numbness in the arms or legs.  No seizures.  Skin--No rashes.       Physical Exam:   Vital Signs:  Temp (24hrs), Av.2 °F (36.8 °C), Min:98.2 °F (36.8 °C), Max:98.2 °F (36.8 °C)    Temp  Min: 98.2 °F (36.8 °C)  Max: 98.2 °F (36.8 °C)  BP  Min: 118/81  Max: 118/81  Pulse  Min: 78  Max: 78  Resp  Min: 15  Max: 15  SpO2  Min: 98 %  Max: 98 %    Gen: Well developed, well nourished, alert and cooperative, and appears to be in no acute distress.  HEAD: normocephalic.  EYES: PERRL, EOMI, vision is grossly intact.  CARDIAC: There is no peripheral edema, cyanosis or pallor. Extremities are warm and well perfused.  LUNGS: non labored breathing  ABDOMEN: Soft, nondistended, nontender. No masses. no scars, no palpable hernia, rectus diastasis, excess skin and fat no scars    Breast Exam: bilateral breast with implants in place submuscular laterally displace, excess skin extender laterally Rt>Lt nipples in good position, capsules soft nontender      Laboratory Data:  Results from last 7 days   Lab Units 24  1031   WBC 10*3/mm3 4.95   HEMOGLOBIN g/dL 12.6   HEMATOCRIT % 39.3   PLATELETS 10*3/mm3 268     Results from last 7 days   Lab Units 24  1031   SODIUM mmol/L 139   POTASSIUM mmol/L 4.8   CHLORIDE mmol/L 103   CO2 mmol/L 30.0*  "  BUN mg/dL 11   CREATININE mg/dL 0.76   GLUCOSE mg/dL 108*   CALCIUM mg/dL 9.3                             Estimated Creatinine Clearance: 79.4 mL/min (by C-G formula based on SCr of 0.76 mg/dL).      Lab 07/08/24  1031   HEMOGLOBIN A1C 5.20                Microbiology:  No results found for: \"ACANTHNAEG\", \"AFBCX\", \"BPERTUSSISCX\", \"BLOODCX\"  No results found for: \"BCIDPCR\", \"CXREFLEX\", \"CSFCX\", \"CULTURETIS\"  No results found for: \"CULTURES\", \"HSVCX\", \"URCX\"  No results found for: \"EYECULTURE\", \"GCCX\", \"HSVCULTURE\", \"LABHSV\"  No results found for: \"LEGIONELLA\", \"MRSACX\", \"MUMPSCX\", \"MYCOPLASCX\"  No results found for: \"NOCARDIACX\", \"STOOLCX\"  No results found for: \"THROATCX\", \"UNSTIMCULT\", \"URINECX\", \"CULTURE\", \"VZVCULTUR\"  No results found for: \"VIRALCULTU\", \"WOUNDCX\"          Assessment: 59yoF nonsmoker hx of heart murmur, breast cancer with treatment and breast reconstruction with laterally displace breast implants and abdominal excess fat/skin      Plan:  Absent breast and nipple  Based on PE most likely laterally displaced implants. Discussed removal and replacement of breast implants with capsule tightening and possible mesh placement. .    Discussed risks including hematoma, seroma, infection, chronic pain, damage to surrounding tissue, breast asymmetry, failed  implant, implant malposition, revision surgery, tissue thinning, ALCL, breast implant associated illness, capsular contracture      Excess abdominal skin and fat  Again Discussed abdominoplasty with liposuction.     Again Discussed risks including hematoma, seroma, infection, nerve injury, damage to surrounding tissue, asymmetry, poor or bad scarring, loss of umbilicus, umbilicus malposition, abdominal hernia, bowel injury, excess mons, continued extraabdominal fat,  blood clots, pulmonary embolism.    To OR for Removal of saline implants and placement of new silicone implants along with bilateral breast revision and capsule tightening possible mesh " placement Extended abdominoplasty with lateral flank liposuction with a T block  Outpatient            Felipe Ledesma MD  07/15/24  07:09 EDT

## 2024-07-15 NOTE — ANESTHESIA PREPROCEDURE EVALUATION
Anesthesia Evaluation     Patient summary reviewed and Nursing notes reviewed   NPO Solid Status: > 8 hours  NPO Liquid Status: > 2 hours           Airway   Mallampati: I  TM distance: >3 FB  Neck ROM: full  No difficulty expected  Dental      Pulmonary    (+) asthma,  (-) shortness of breath, recent URI, sleep apnea, not a smoker (remote), no home oxygen  Cardiovascular     ECG reviewed    (+) valvular problems/murmurs (AI mod) AI, CAD (calcificatioon CA)  (-) hypertension, dysrhythmias, angina, cardiac stents    ROS comment: ECG ST NS ST TW abn   ECHO 22 Mod AI Ef >55% MPS 22 Normal -av ex cap - Ef 63%   Cleared by cards who sees her for incidental calcification of LAD and AI (mod)     Neuro/Psych  (+) headaches  (-) seizures, CVA  GI/Hepatic/Renal/Endo    (-) no renal disease, diabetes, no thyroid disorder    Musculoskeletal     Abdominal    Substance History      OB/GYN          Other      history of cancer (sp breast cancer 2012 mastectomy  reconstruction)                  Anesthesia Plan    ASA 3     general     (Propofol infusion as part of an anti PONV technique  )  intravenous induction     Anesthetic plan, risks, benefits, and alternatives have been provided, discussed and informed consent has been obtained with: patient.    Plan discussed with CRNA.      CODE STATUS:

## 2024-07-15 NOTE — ANESTHESIA POSTPROCEDURE EVALUATION
Patient: Amanda Chavez    Procedure Summary       Date: 07/15/24 Room / [facility]:  [facility]    Anesthesia Start: 0730 Anesthesia Stop: 1443    Procedures:       BREAST IMPLANT REMOVAL AND REPLACEMENT (Bilateral: Breast)      ABDOMINOPLASTY WITH LIPOSUCTION (Abdomen) Diagnosis:     Surgeons: Felipe Ledesma MD Provider: Sascha Torres MD    Anesthesia Type: general ASA Status: 3            Anesthesia Type: general    Vitals  Vitals Value Taken Time   /69 07/15/24 1440   Temp     Pulse 74 07/15/24 1442   Resp     SpO2 100 % 07/15/24 1442   Vitals shown include unfiled device data.        Post Anesthesia Care and Evaluation    Patient location during evaluation: PACU  Patient participation: complete - patient participated  Level of consciousness: sleepy but conscious  Pain management: adequate    Airway patency: patent  Anesthetic complications: No anesthetic complications  PONV Status: none  Cardiovascular status: hemodynamically stable and acceptable  Respiratory status: nonlabored ventilation, acceptable, nasal cannula and oral airway  Hydration status: acceptable

## 2024-07-17 LAB
CYTO UR: NORMAL
LAB AP CASE REPORT: NORMAL
LAB AP CLINICAL INFORMATION: NORMAL
PATH REPORT.FINAL DX SPEC: NORMAL
PATH REPORT.GROSS SPEC: NORMAL

## 2024-09-23 ENCOUNTER — PRE-ADMISSION TESTING (OUTPATIENT)
Dept: PREADMISSION TESTING | Facility: HOSPITAL | Age: 59
End: 2024-09-23

## 2024-09-23 VITALS — HEIGHT: 65 IN | WEIGHT: 145.6 LBS | BODY MASS INDEX: 24.26 KG/M2

## 2024-09-23 LAB
ANION GAP SERPL CALCULATED.3IONS-SCNC: 11 MMOL/L (ref 5–15)
BUN SERPL-MCNC: 16 MG/DL (ref 8–23)
BUN/CREAT SERPL: 25.4 (ref 7–25)
CALCIUM SPEC-SCNC: 9.2 MG/DL (ref 8.6–10.5)
CHLORIDE SERPL-SCNC: 102 MMOL/L (ref 98–107)
CO2 SERPL-SCNC: 27 MMOL/L (ref 22–29)
CREAT SERPL-MCNC: 0.63 MG/DL (ref 0.57–1)
DEPRECATED RDW RBC AUTO: 43.3 FL (ref 37–54)
EGFRCR SERPLBLD CKD-EPI 2021: 101.7 ML/MIN/1.73
ERYTHROCYTE [DISTWIDTH] IN BLOOD BY AUTOMATED COUNT: 13.1 % (ref 12.3–15.4)
GLUCOSE SERPL-MCNC: 126 MG/DL (ref 65–99)
HBA1C MFR BLD: 5.3 % (ref 4.8–5.6)
HCT VFR BLD AUTO: 38.2 % (ref 34–46.6)
HGB BLD-MCNC: 12.2 G/DL (ref 12–15.9)
MCH RBC QN AUTO: 28.9 PG (ref 26.6–33)
MCHC RBC AUTO-ENTMCNC: 31.9 G/DL (ref 31.5–35.7)
MCV RBC AUTO: 90.5 FL (ref 79–97)
PLATELET # BLD AUTO: 317 10*3/MM3 (ref 140–450)
PMV BLD AUTO: 9.7 FL (ref 6–12)
POTASSIUM SERPL-SCNC: 3.7 MMOL/L (ref 3.5–5.2)
RBC # BLD AUTO: 4.22 10*6/MM3 (ref 3.77–5.28)
SODIUM SERPL-SCNC: 140 MMOL/L (ref 136–145)
WBC NRBC COR # BLD AUTO: 6.49 10*3/MM3 (ref 3.4–10.8)

## 2024-09-23 PROCEDURE — 80048 BASIC METABOLIC PNL TOTAL CA: CPT

## 2024-09-23 PROCEDURE — 83036 HEMOGLOBIN GLYCOSYLATED A1C: CPT

## 2024-09-23 PROCEDURE — 85027 COMPLETE CBC AUTOMATED: CPT

## 2024-09-23 PROCEDURE — 36415 COLL VENOUS BLD VENIPUNCTURE: CPT

## 2024-09-23 RX ORDER — DIPHENHYDRAMINE HCL 25 MG
25 CAPSULE ORAL EVERY 6 HOURS PRN
COMMUNITY
End: 2024-09-30

## 2024-09-29 ENCOUNTER — ANESTHESIA EVENT (OUTPATIENT)
Dept: PERIOP | Facility: HOSPITAL | Age: 59
End: 2024-09-29

## 2024-09-29 RX ORDER — SODIUM CHLORIDE 9 MG/ML
40 INJECTION, SOLUTION INTRAVENOUS AS NEEDED
Status: CANCELLED | OUTPATIENT
Start: 2024-09-29

## 2024-09-29 RX ORDER — SODIUM CHLORIDE 0.9 % (FLUSH) 0.9 %
10 SYRINGE (ML) INJECTION AS NEEDED
Status: CANCELLED | OUTPATIENT
Start: 2024-09-29

## 2024-09-29 RX ORDER — FAMOTIDINE 10 MG/ML
20 INJECTION, SOLUTION INTRAVENOUS ONCE
Status: CANCELLED | OUTPATIENT
Start: 2024-09-29 | End: 2024-09-29

## 2024-09-29 RX ORDER — SODIUM CHLORIDE 0.9 % (FLUSH) 0.9 %
10 SYRINGE (ML) INJECTION EVERY 12 HOURS SCHEDULED
Status: CANCELLED | OUTPATIENT
Start: 2024-09-29

## 2024-09-30 ENCOUNTER — ANESTHESIA (OUTPATIENT)
Dept: PERIOP | Facility: HOSPITAL | Age: 59
End: 2024-09-30

## 2024-09-30 ENCOUNTER — HOSPITAL ENCOUNTER (OUTPATIENT)
Facility: HOSPITAL | Age: 59
Setting detail: HOSPITAL OUTPATIENT SURGERY
Discharge: HOME | End: 2024-09-30
Attending: PLASTIC SURGERY | Admitting: PLASTIC SURGERY

## 2024-09-30 VITALS
TEMPERATURE: 97.2 F | OXYGEN SATURATION: 91 % | HEIGHT: 65 IN | SYSTOLIC BLOOD PRESSURE: 109 MMHG | BODY MASS INDEX: 24.16 KG/M2 | WEIGHT: 145 LBS | HEART RATE: 105 BPM | RESPIRATION RATE: 15 BRPM | DIASTOLIC BLOOD PRESSURE: 56 MMHG

## 2024-09-30 PROCEDURE — 25010000002 ALBUMIN HUMAN 5% PER 50 ML

## 2024-09-30 PROCEDURE — 25010000002 PROPOFOL 10 MG/ML EMULSION

## 2024-09-30 PROCEDURE — 25010000002 GLYCOPYRROLATE 1 MG/5ML SOLUTION

## 2024-09-30 PROCEDURE — 25010000002 CEFAZOLIN PER 500 MG

## 2024-09-30 PROCEDURE — 25810000003 LACTATED RINGERS PER 1000 ML: Performed by: ANESTHESIOLOGY

## 2024-09-30 PROCEDURE — 25010000002 FENTANYL CITRATE (PF) 100 MCG/2ML SOLUTION

## 2024-09-30 PROCEDURE — 25010000002 DROPERIDOL PER 5 MG

## 2024-09-30 PROCEDURE — 25010000002 FENTANYL CITRATE (PF) 50 MCG/ML SOLUTION

## 2024-09-30 PROCEDURE — 25010000002 VASOPRESSIN 20 UNIT/ML SOLUTION

## 2024-09-30 PROCEDURE — 25010000002 SUGAMMADEX 200 MG/2ML SOLUTION

## 2024-09-30 PROCEDURE — 25010000002 HYDROMORPHONE 1 MG/ML SOLUTION

## 2024-09-30 PROCEDURE — 25010000002 DEXAMETHASONE PER 1 MG

## 2024-09-30 PROCEDURE — P9041 ALBUMIN (HUMAN),5%, 50ML: HCPCS

## 2024-09-30 PROCEDURE — 25010000002 ONDANSETRON PER 1 MG

## 2024-09-30 PROCEDURE — 25010000002 CEFAZOLIN PER 500 MG: Performed by: PLASTIC SURGERY

## 2024-09-30 DEVICE — DEV CONTRL TISS STRATAFIX SPIRAL MNCRYL UD 3/0 PLS 60CM: Type: IMPLANTABLE DEVICE | Site: ARM | Status: FUNCTIONAL

## 2024-09-30 RX ORDER — FENTANYL CITRATE 50 UG/ML
INJECTION, SOLUTION INTRAMUSCULAR; INTRAVENOUS
Status: COMPLETED
Start: 2024-09-30 | End: 2024-09-30

## 2024-09-30 RX ORDER — ALBUMIN, HUMAN INJ 5% 5 %
SOLUTION INTRAVENOUS CONTINUOUS PRN
Status: DISCONTINUED | OUTPATIENT
Start: 2024-09-30 | End: 2024-09-30 | Stop reason: SURG

## 2024-09-30 RX ORDER — FAMOTIDINE 20 MG/1
20 TABLET, FILM COATED ORAL ONCE
Status: COMPLETED | OUTPATIENT
Start: 2024-09-30 | End: 2024-09-30

## 2024-09-30 RX ORDER — FENTANYL CITRATE 50 UG/ML
50 INJECTION, SOLUTION INTRAMUSCULAR; INTRAVENOUS
Status: DISCONTINUED | OUTPATIENT
Start: 2024-09-30 | End: 2024-09-30 | Stop reason: HOSPADM

## 2024-09-30 RX ORDER — PROMETHAZINE HYDROCHLORIDE 25 MG/1
25 SUPPOSITORY RECTAL ONCE AS NEEDED
Status: DISCONTINUED | OUTPATIENT
Start: 2024-09-30 | End: 2024-09-30 | Stop reason: HOSPADM

## 2024-09-30 RX ORDER — SODIUM CHLORIDE, SODIUM LACTATE, POTASSIUM CHLORIDE, CALCIUM CHLORIDE 600; 310; 30; 20 MG/100ML; MG/100ML; MG/100ML; MG/100ML
9 INJECTION, SOLUTION INTRAVENOUS CONTINUOUS
Status: DISCONTINUED | OUTPATIENT
Start: 2024-09-30 | End: 2024-09-30 | Stop reason: HOSPADM

## 2024-09-30 RX ORDER — PROPOFOL 10 MG/ML
VIAL (ML) INTRAVENOUS AS NEEDED
Status: DISCONTINUED | OUTPATIENT
Start: 2024-09-30 | End: 2024-09-30 | Stop reason: SURG

## 2024-09-30 RX ORDER — SODIUM CHLORIDE 9 MG/ML
40 INJECTION, SOLUTION INTRAVENOUS AS NEEDED
Status: DISCONTINUED | OUTPATIENT
Start: 2024-09-30 | End: 2024-09-30 | Stop reason: HOSPADM

## 2024-09-30 RX ORDER — HYDROCODONE BITARTRATE AND ACETAMINOPHEN 5; 325 MG/1; MG/1
TABLET ORAL
Status: COMPLETED
Start: 2024-09-30 | End: 2024-09-30

## 2024-09-30 RX ORDER — DEXAMETHASONE SODIUM PHOSPHATE 4 MG/ML
INJECTION, SOLUTION INTRA-ARTICULAR; INTRALESIONAL; INTRAMUSCULAR; INTRAVENOUS; SOFT TISSUE AS NEEDED
Status: DISCONTINUED | OUTPATIENT
Start: 2024-09-30 | End: 2024-09-30 | Stop reason: SURG

## 2024-09-30 RX ORDER — GLYCOPYRROLATE 0.2 MG/ML
INJECTION INTRAMUSCULAR; INTRAVENOUS AS NEEDED
Status: DISCONTINUED | OUTPATIENT
Start: 2024-09-30 | End: 2024-09-30 | Stop reason: SURG

## 2024-09-30 RX ORDER — LIDOCAINE HYDROCHLORIDE 10 MG/ML
0.5 INJECTION, SOLUTION EPIDURAL; INFILTRATION; INTRACAUDAL; PERINEURAL ONCE AS NEEDED
Status: COMPLETED | OUTPATIENT
Start: 2024-09-30 | End: 2024-09-30

## 2024-09-30 RX ORDER — PROMETHAZINE HYDROCHLORIDE 25 MG/1
25 TABLET ORAL ONCE AS NEEDED
Status: DISCONTINUED | OUTPATIENT
Start: 2024-09-30 | End: 2024-09-30 | Stop reason: HOSPADM

## 2024-09-30 RX ORDER — LABETALOL HYDROCHLORIDE 5 MG/ML
5 INJECTION, SOLUTION INTRAVENOUS
Status: DISCONTINUED | OUTPATIENT
Start: 2024-09-30 | End: 2024-09-30 | Stop reason: HOSPADM

## 2024-09-30 RX ORDER — MEPERIDINE HYDROCHLORIDE 25 MG/ML
12.5 INJECTION INTRAMUSCULAR; INTRAVENOUS; SUBCUTANEOUS
Status: DISCONTINUED | OUTPATIENT
Start: 2024-09-30 | End: 2024-09-30 | Stop reason: HOSPADM

## 2024-09-30 RX ORDER — NALOXONE HCL 0.4 MG/ML
0.4 VIAL (ML) INJECTION AS NEEDED
Status: DISCONTINUED | OUTPATIENT
Start: 2024-09-30 | End: 2024-09-30 | Stop reason: HOSPADM

## 2024-09-30 RX ORDER — HYDRALAZINE HYDROCHLORIDE 20 MG/ML
5 INJECTION INTRAMUSCULAR; INTRAVENOUS
Status: DISCONTINUED | OUTPATIENT
Start: 2024-09-30 | End: 2024-09-30 | Stop reason: HOSPADM

## 2024-09-30 RX ORDER — VASOPRESSIN 20 U/ML
INJECTION PARENTERAL AS NEEDED
Status: DISCONTINUED | OUTPATIENT
Start: 2024-09-30 | End: 2024-09-30 | Stop reason: SURG

## 2024-09-30 RX ORDER — DROPERIDOL 2.5 MG/ML
0.62 INJECTION, SOLUTION INTRAMUSCULAR; INTRAVENOUS ONCE AS NEEDED
Status: DISCONTINUED | OUTPATIENT
Start: 2024-09-30 | End: 2024-09-30 | Stop reason: HOSPADM

## 2024-09-30 RX ORDER — IPRATROPIUM BROMIDE AND ALBUTEROL SULFATE 2.5; .5 MG/3ML; MG/3ML
3 SOLUTION RESPIRATORY (INHALATION) ONCE AS NEEDED
Status: DISCONTINUED | OUTPATIENT
Start: 2024-09-30 | End: 2024-09-30 | Stop reason: HOSPADM

## 2024-09-30 RX ORDER — BUPIVACAINE HCL/0.9 % NACL/PF 0.125 %
PLASTIC BAG, INJECTION (ML) EPIDURAL AS NEEDED
Status: DISCONTINUED | OUTPATIENT
Start: 2024-09-30 | End: 2024-09-30 | Stop reason: SURG

## 2024-09-30 RX ORDER — EPHEDRINE SULFATE 50 MG/ML
INJECTION INTRAVENOUS AS NEEDED
Status: DISCONTINUED | OUTPATIENT
Start: 2024-09-30 | End: 2024-09-30 | Stop reason: SURG

## 2024-09-30 RX ORDER — ONDANSETRON 2 MG/ML
4 INJECTION INTRAMUSCULAR; INTRAVENOUS ONCE AS NEEDED
Status: DISCONTINUED | OUTPATIENT
Start: 2024-09-30 | End: 2024-09-30 | Stop reason: HOSPADM

## 2024-09-30 RX ORDER — FENTANYL CITRATE 50 UG/ML
INJECTION, SOLUTION INTRAMUSCULAR; INTRAVENOUS AS NEEDED
Status: DISCONTINUED | OUTPATIENT
Start: 2024-09-30 | End: 2024-09-30 | Stop reason: SURG

## 2024-09-30 RX ORDER — ONDANSETRON 2 MG/ML
INJECTION INTRAMUSCULAR; INTRAVENOUS AS NEEDED
Status: DISCONTINUED | OUTPATIENT
Start: 2024-09-30 | End: 2024-09-30 | Stop reason: SURG

## 2024-09-30 RX ORDER — HYDROMORPHONE HYDROCHLORIDE 1 MG/ML
0.5 INJECTION, SOLUTION INTRAMUSCULAR; INTRAVENOUS; SUBCUTANEOUS
Status: DISCONTINUED | OUTPATIENT
Start: 2024-09-30 | End: 2024-09-30 | Stop reason: HOSPADM

## 2024-09-30 RX ORDER — LIDOCAINE HYDROCHLORIDE 10 MG/ML
INJECTION, SOLUTION EPIDURAL; INFILTRATION; INTRACAUDAL; PERINEURAL AS NEEDED
Status: DISCONTINUED | OUTPATIENT
Start: 2024-09-30 | End: 2024-09-30 | Stop reason: SURG

## 2024-09-30 RX ORDER — MAGNESIUM HYDROXIDE 1200 MG/15ML
LIQUID ORAL AS NEEDED
Status: DISCONTINUED | OUTPATIENT
Start: 2024-09-30 | End: 2024-09-30 | Stop reason: HOSPADM

## 2024-09-30 RX ORDER — SODIUM CHLORIDE 0.9 % (FLUSH) 0.9 %
3 SYRINGE (ML) INJECTION EVERY 12 HOURS SCHEDULED
Status: DISCONTINUED | OUTPATIENT
Start: 2024-09-30 | End: 2024-09-30 | Stop reason: HOSPADM

## 2024-09-30 RX ORDER — SODIUM CHLORIDE 0.9 % (FLUSH) 0.9 %
3-10 SYRINGE (ML) INJECTION AS NEEDED
Status: DISCONTINUED | OUTPATIENT
Start: 2024-09-30 | End: 2024-09-30 | Stop reason: HOSPADM

## 2024-09-30 RX ORDER — DROPERIDOL 2.5 MG/ML
INJECTION, SOLUTION INTRAMUSCULAR; INTRAVENOUS
Status: COMPLETED
Start: 2024-09-30 | End: 2024-09-30

## 2024-09-30 RX ORDER — HYDROCODONE BITARTRATE AND ACETAMINOPHEN 5; 325 MG/1; MG/1
1 TABLET ORAL ONCE AS NEEDED
Status: DISCONTINUED | OUTPATIENT
Start: 2024-09-30 | End: 2024-09-30 | Stop reason: HOSPADM

## 2024-09-30 RX ORDER — CEFAZOLIN SODIUM 1 G/3ML
INJECTION, POWDER, FOR SOLUTION INTRAMUSCULAR; INTRAVENOUS AS NEEDED
Status: DISCONTINUED | OUTPATIENT
Start: 2024-09-30 | End: 2024-09-30 | Stop reason: SURG

## 2024-09-30 RX ORDER — MIDAZOLAM HYDROCHLORIDE 1 MG/ML
1 INJECTION INTRAMUSCULAR; INTRAVENOUS
Status: DISCONTINUED | OUTPATIENT
Start: 2024-09-30 | End: 2024-09-30 | Stop reason: HOSPADM

## 2024-09-30 RX ORDER — ROCURONIUM BROMIDE 10 MG/ML
INJECTION, SOLUTION INTRAVENOUS AS NEEDED
Status: DISCONTINUED | OUTPATIENT
Start: 2024-09-30 | End: 2024-09-30 | Stop reason: SURG

## 2024-09-30 RX ORDER — DEXMEDETOMIDINE HYDROCHLORIDE 4 UG/ML
INJECTION, SOLUTION INTRAVENOUS AS NEEDED
Status: DISCONTINUED | OUTPATIENT
Start: 2024-09-30 | End: 2024-09-30 | Stop reason: SURG

## 2024-09-30 RX ORDER — DROPERIDOL 2.5 MG/ML
0.62 INJECTION, SOLUTION INTRAMUSCULAR; INTRAVENOUS
Status: DISCONTINUED | OUTPATIENT
Start: 2024-09-30 | End: 2024-09-30 | Stop reason: HOSPADM

## 2024-09-30 RX ADMIN — CEFAZOLIN 2 G: 1 INJECTION, POWDER, FOR SOLUTION INTRAMUSCULAR; INTRAVENOUS at 10:21

## 2024-09-30 RX ADMIN — PROPOFOL 130 MG: 10 INJECTION, EMULSION INTRAVENOUS at 07:34

## 2024-09-30 RX ADMIN — Medication 100 MCG: at 07:58

## 2024-09-30 RX ADMIN — FAMOTIDINE 20 MG: 20 TABLET, FILM COATED ORAL at 06:34

## 2024-09-30 RX ADMIN — HYDROCODONE BITARTRATE AND ACETAMINOPHEN 1 TABLET: 5; 325 TABLET ORAL at 11:43

## 2024-09-30 RX ADMIN — VASOPRESSIN 2 UNITS: 20 INJECTION INTRAVENOUS at 10:17

## 2024-09-30 RX ADMIN — ROCURONIUM BROMIDE 20 MG: 10 INJECTION INTRAVENOUS at 08:27

## 2024-09-30 RX ADMIN — SODIUM CHLORIDE, POTASSIUM CHLORIDE, SODIUM LACTATE AND CALCIUM CHLORIDE: 600; 310; 30; 20 INJECTION, SOLUTION INTRAVENOUS at 06:49

## 2024-09-30 RX ADMIN — FENTANYL CITRATE 50 MCG: 50 INJECTION, SOLUTION INTRAMUSCULAR; INTRAVENOUS at 11:34

## 2024-09-30 RX ADMIN — Medication 100 MCG: at 09:50

## 2024-09-30 RX ADMIN — VASOPRESSIN 2 UNITS: 20 INJECTION INTRAVENOUS at 09:59

## 2024-09-30 RX ADMIN — FENTANYL CITRATE 50 MCG: 50 INJECTION, SOLUTION INTRAMUSCULAR; INTRAVENOUS at 11:03

## 2024-09-30 RX ADMIN — ONDANSETRON 4 MG: 2 INJECTION INTRAMUSCULAR; INTRAVENOUS at 10:29

## 2024-09-30 RX ADMIN — SODIUM CHLORIDE 2000 MG: 900 INJECTION INTRAVENOUS at 07:39

## 2024-09-30 RX ADMIN — Medication 100 MCG: at 09:03

## 2024-09-30 RX ADMIN — LIDOCAINE HYDROCHLORIDE 0.5 ML: 10 INJECTION, SOLUTION EPIDURAL; INFILTRATION; INTRACAUDAL; PERINEURAL at 06:34

## 2024-09-30 RX ADMIN — SODIUM CHLORIDE, POTASSIUM CHLORIDE, SODIUM LACTATE AND CALCIUM CHLORIDE: 600; 310; 30; 20 INJECTION, SOLUTION INTRAVENOUS at 08:25

## 2024-09-30 RX ADMIN — FENTANYL CITRATE 25 MCG: 50 INJECTION, SOLUTION INTRAMUSCULAR; INTRAVENOUS at 08:42

## 2024-09-30 RX ADMIN — Medication 100 MCG: at 10:32

## 2024-09-30 RX ADMIN — ROCURONIUM BROMIDE 20 MG: 10 INJECTION INTRAVENOUS at 09:27

## 2024-09-30 RX ADMIN — EPHEDRINE SULFATE 15 MG: 50 INJECTION INTRAVENOUS at 08:14

## 2024-09-30 RX ADMIN — SUGAMMADEX 200 MG: 100 INJECTION, SOLUTION INTRAVENOUS at 10:42

## 2024-09-30 RX ADMIN — DROPERIDOL 0.62 MG: 2.5 INJECTION, SOLUTION INTRAMUSCULAR; INTRAVENOUS at 11:16

## 2024-09-30 RX ADMIN — LIDOCAINE HYDROCHLORIDE 50 MG: 10 INJECTION, SOLUTION EPIDURAL; INFILTRATION; INTRACAUDAL; PERINEURAL at 07:34

## 2024-09-30 RX ADMIN — Medication 100 MCG: at 07:54

## 2024-09-30 RX ADMIN — ALBUMIN (HUMAN): 12.5 INJECTION, SOLUTION INTRAVENOUS at 09:08

## 2024-09-30 RX ADMIN — DEXMEDETOMIDINE HYDROCHLORIDE IN 0.9% SODIUM CHLORIDE 8 MCG: 4 INJECTION INTRAVENOUS at 08:23

## 2024-09-30 RX ADMIN — EPHEDRINE SULFATE 10 MG: 50 INJECTION INTRAVENOUS at 07:47

## 2024-09-30 RX ADMIN — ROCURONIUM BROMIDE 50 MG: 10 INJECTION INTRAVENOUS at 07:34

## 2024-09-30 RX ADMIN — VASOPRESSIN 3 UNITS: 20 INJECTION INTRAVENOUS at 10:23

## 2024-09-30 RX ADMIN — Medication 100 MCG: at 08:59

## 2024-09-30 RX ADMIN — FENTANYL CITRATE 25 MCG: 50 INJECTION, SOLUTION INTRAMUSCULAR; INTRAVENOUS at 08:19

## 2024-09-30 RX ADMIN — FENTANYL CITRATE 50 MCG: 50 INJECTION, SOLUTION INTRAMUSCULAR; INTRAVENOUS at 07:34

## 2024-09-30 RX ADMIN — HYDROMORPHONE HYDROCHLORIDE 0.5 MG: 1 INJECTION, SOLUTION INTRAMUSCULAR; INTRAVENOUS; SUBCUTANEOUS at 11:24

## 2024-09-30 RX ADMIN — Medication 200 MCG: at 09:07

## 2024-09-30 RX ADMIN — DEXMEDETOMIDINE HYDROCHLORIDE IN 0.9% SODIUM CHLORIDE 4 MCG: 4 INJECTION INTRAVENOUS at 08:48

## 2024-09-30 RX ADMIN — Medication 100 MCG: at 09:41

## 2024-09-30 RX ADMIN — DEXAMETHASONE SODIUM PHOSPHATE 8 MG: 4 INJECTION INTRA-ARTICULAR; INTRALESIONAL; INTRAMUSCULAR; INTRAVENOUS; SOFT TISSUE at 07:38

## 2024-09-30 RX ADMIN — DEXMEDETOMIDINE HYDROCHLORIDE IN 0.9% SODIUM CHLORIDE 8 MCG: 4 INJECTION INTRAVENOUS at 08:36

## 2024-09-30 RX ADMIN — PROPOFOL 25 MCG/KG/MIN: 10 INJECTION, EMULSION INTRAVENOUS at 07:41

## 2024-09-30 RX ADMIN — SODIUM CHLORIDE, POTASSIUM CHLORIDE, SODIUM LACTATE AND CALCIUM CHLORIDE: 600; 310; 30; 20 INJECTION, SOLUTION INTRAVENOUS at 10:27

## 2024-09-30 RX ADMIN — GLYCOPYRROLATE 0.1 MCG: 0.2 INJECTION INTRAMUSCULAR; INTRAVENOUS at 08:00

## 2024-09-30 NOTE — ANESTHESIA POSTPROCEDURE EVALUATION
Patient: Amanda Chavez    Procedure Summary       Date: 09/30/24 Room / [facility]    Anesthesia Start: 0726 Anesthesia Stop: 1101    Procedure: BRACHIOPLASTY BILATERAL (BAT WINGS) (Bilateral: Arm Upper) Diagnosis:     Surgeons: Felipe Ledesma MD Provider: Sascha Torres MD    Anesthesia Type: general ASA Status: 2            Anesthesia Type: general    Vitals  Vitals Value Taken Time   /64 09/30/24 1054   Temp 96 °F (35.6 °C) 09/30/24 1054   Pulse 93 09/30/24 1059   Resp 14 09/30/24 1054   SpO2 96 % 09/30/24 1059   Vitals shown include unfiled device data.        Post Anesthesia Care and Evaluation    Patient location during evaluation: PACU  Patient participation: complete - patient participated  Level of consciousness: awake and alert  Pain management: adequate    Airway patency: patent  Anesthetic complications: No anesthetic complications  PONV Status: none  Cardiovascular status: hemodynamically stable and acceptable  Respiratory status: nonlabored ventilation, acceptable and nasal cannula  Hydration status: acceptable

## 2024-09-30 NOTE — ANESTHESIA PREPROCEDURE EVALUATION
Anesthesia Evaluation     Patient summary reviewed and Nursing notes reviewed   NPO Solid Status: > 8 hours  NPO Liquid Status: > 2 hours           Airway   Mallampati: I  TM distance: >3 FB  Neck ROM: full  No difficulty expected  Dental      Pulmonary     breath sounds clear to auscultation  (-) asthma, recent URI, not a smoker  Cardiovascular     ECG reviewed  Rhythm: regular    (+) valvular problems/murmurs (never had ECHO) murmur, murmur, hyperlipidemia  (-) dysrhythmias, angina, cardiac stents    ROS comment: ECG SR NS ST TW abn   PE comment: Soft diastolic ? M heard II/IV  No radiation to neck     Neuro/Psych  (+) headaches  (-) seizures, CVA  GI/Hepatic/Renal/Endo    (-) no renal disease, diabetes, no thyroid disorder    Musculoskeletal     Abdominal    Substance History      OB/GYN          Other                      Anesthesia Plan    ASA 2     general     (Propofol infusion as part of an anti PONV technique  )  intravenous induction     Anesthetic plan, risks, benefits, and alternatives have been provided, discussed and informed consent has been obtained with: patient.    Plan discussed with CRNA.      CODE STATUS:

## 2024-09-30 NOTE — H&P
Plastic Surgery Consult      Admission Date:  2024  LOS:  0  Patient Care Team:  Gauri Grimes MD as PCP - General (Family Medicine)  Jasmyne Ferris APRN as PCP - Internal Medicine (Nurse Practitioner)    Patient Name:  Amanda Chavez  :  1965  MRN:  [id number]    Date:  2024      Chief Complaint:    HPI:  Amanda Chavez is a 59yoF nonsmoker hx of heart murmur, breast cancer with treatment and breast reconstruction with bilateral implant malposition and excess abdominal skin.    Patient states hx of breast cancer treated with bilateral mastectomies. Patient did not have to have radiation. Patient underwent bilateral breast reconstruction with tissue expanders and then finally to implants. Patient has saline implant submuscular. Patient states pain on the sides bilaterally Rt>Lt. Patient has not had imaging. Patient does not wear a bra. Patient states soreness deep where the implants rub.    Patient also interested in an abdominoplasty. Patient states has had a tubal no other belly surgeries. No hernias. Patient states two children vaginal no complication. Patient interested in improved contour.    Patient was seen in clinic and found appropriate for removal and replacement of implants with capsule revision for displaced implants and an abdominoplasty. Patient on 7/15/24 underwent removal and replacement of implant with mesh placement followed by an abdominoplasty. Patient did well following surgery. Patient states excess skin lateral flanks and arms. Patient interested in brachoplasty.     placement of bilateral TE  10/12 Removal of TE and placement of Eva silicone gel implants   Removal of breast implant and replacement with saline implants   NAC recon,   Nipple revision and tattooing   Nipple revision and tattooing    PMH: ROMEO, migraines, HLD  PSH: breast reconstruction, mastectomies, hysterectomy    SH: Nonsmoker Denies drinking, no illicit drug abuse.  [facility].  FH: No family history blood clots or bleeding disorders  Allergies: NKDA  Medications: no blood thinners or steroids          History:   Past Medical History:   Diagnosis Date    Aortic valve insufficiency     Aortic valve regurgitation     Cancer     breast    Headache     Heart murmur     Hyperlipidemia     Migraine     Wears glasses      Past Surgical History:   Procedure Laterality Date    ABDOMINOPLASTY N/A 7/15/2024    Procedure: ABDOMINOPLASTY WITH LIPOSUCTION;  Surgeon: Felipe Ledesma MD;  Location: [facility] OR;  Service: Plastics;  Laterality: N/A;    BREAST IMPLANT SURGERY Bilateral 7/15/2024    Procedure: BREAST IMPLANT REMOVAL AND REPLACEMENT;  Surgeon: Felipe Ledesma MD;  Location: [facility] OR;  Service: Plastics;  Laterality: Bilateral;    BREAST RECONSTRUCTION Bilateral     COLONOSCOPY      ENDOSCOPY      HYSTERECTOMY      total    MASTECTOMY Bilateral     WISDOM TOOTH EXTRACTION       History reviewed. No pertinent family history.  Social History     Socioeconomic History    Marital status:    Tobacco Use    Smoking status: Never    Smokeless tobacco: Never   Vaping Use    Vaping status: Never Used   Substance and Sexual Activity    Alcohol use: Yes     Alcohol/week: 4.0 standard drinks of alcohol     Types: 4 Cans of beer per week    Drug use: Never     No Known Allergies    Medication:    Current Facility-Administered Medications:     ceFAZolin 2000 mg IVPB in 100 mL NS (MBP), 2,000 mg, Intravenous, Once, Felipe Ledesma MD    lactated ringers infusion, 9 mL/hr, Intravenous, Continuous, Sascha Torres MD    midazolam (VERSED) injection 1 mg, 1 mg, Intravenous, Q10 Min PRN, Sascha Torres MD    Antibiotics:  Anti-Infectives (From admission, onward)      Ordered     Dose/Rate Route Frequency Start Stop    09/30/24 0616  ceFAZolin 2000 mg IVPB in 100 mL NS (MBP)        Ordering Provider: Felipe Ledesma MD    2,000 mg  over 30 Minutes Intravenous Once 09/30/24 0618               No Known Allergies       Review of Systems:  Constitutional-- No Fever, chills or sweats.  Appetite okay, and no malaise. No fatigue.  HEENT-- No new vision, hearing or throat complaints.  No epistaxis or oral sores.  Denies odynophagia or dysphagia. No headache, photophobia or neck stiffness.  CV-- No chest pain, palpitation or syncope  Resp-- No SOB/cough/Hemoptysis  GI- No nausea, vomiting, or diarrhea.  No hematochezia, melena, or hematemesis. Denies jaundice or chronic liver disease.  -- No dysuria, hematuria, or flank pain.  Denies hesitancy, urgency or flank pain.  Lymph- no swollen lymph nodes in neck/axilla or groin.   Heme- No active bruising or bleeding; no Hx of DVT or PE.  MS-- no swelling or pain in the bones or joints of arms/legs.  No new back pain.  He does not verbalize pain per sister at bedside.  Neuro-- No acute focal weakness or numbness in the arms or legs.  No seizures.  Skin--No rashes.       Physical Exam:   Vital Signs:  Temp (24hrs), Av.7 °F (35.9 °C), Min:96.7 °F (35.9 °C), Max:96.7 °F (35.9 °C)    Temp  Min: 96.7 °F (35.9 °C)  Max: 96.7 °F (35.9 °C)  BP  Min: 107/72  Max: 107/72  Pulse  Min: 76  Max: 76  Resp  Min: 16  Max: 16  SpO2  Min: 98 %  Max: 98 %    Gen: Well developed, well nourished, alert and cooperative, and appears to be in no acute distress.  HEAD: normocephalic.  EYES: PERRL, EOMI, vision is grossly intact.  CARDIAC: There is no peripheral edema, cyanosis or pallor. Extremities are warm and well perfused.  LUNGS: non labored breathing    Breast: implants in place, incision C/D/I  Abdomen: incision C/D/I        Laboratory Data:  Results from last 7 days   Lab Units 24  1337   WBC 10*3/mm3 6.49   HEMOGLOBIN g/dL 12.2   HEMATOCRIT % 38.2   PLATELETS 10*3/mm3 317     Results from last 7 days   Lab Units 24  1337   SODIUM mmol/L 140   POTASSIUM mmol/L 3.7   CHLORIDE mmol/L 102   CO2 mmol/L 27.0   BUN mg/dL 16   CREATININE mg/dL 0.63   GLUCOSE mg/dL 126*  "  CALCIUM mg/dL 9.2                             Estimated Creatinine Clearance: 98.6 mL/min (by C-G formula based on SCr of 0.63 mg/dL).      Lab 09/23/24  1337   HEMOGLOBIN A1C 5.30                Microbiology:  No results found for: \"ACANTHNAEG\", \"AFBCX\", \"BPERTUSSISCX\", \"BLOODCX\"  No results found for: \"BCIDPCR\", \"CXREFLEX\", \"CSFCX\", \"CULTURETIS\"  No results found for: \"CULTURES\", \"HSVCX\", \"URCX\"  No results found for: \"EYECULTURE\", \"GCCX\", \"HSVCULTURE\", \"LABHSV\"  No results found for: \"LEGIONELLA\", \"MRSACX\", \"MUMPSCX\", \"MYCOPLASCX\"  No results found for: \"NOCARDIACX\", \"STOOLCX\"  No results found for: \"THROATCX\", \"UNSTIMCULT\", \"URINECX\", \"CULTURE\", \"VZVCULTUR\"  No results found for: \"VIRALCULTU\", \"WOUNDCX\"          Assessment: 59yoF nonsmoker hx of heart murmur, breast cancer with treatment and breast reconstruction with laterally displace breast implants and abdominal excess fat/skin s/p 7/15/24 bilateral breast removal and replacement of implants with revisions mesh placement and abdominoplasty complicated by fall 8/6/24 causing an opening in Lt breast incision requiring washout and closure in the clinic    New Implants Allergan Smooth Silicone  Rt breast implant: [id number],  Lt breast implant: [id number],    Plan:    excess arm skin  Discussed liposuction vs brachioplasty, based on PE recommend extended brachioplasty with extension to excise lateral dog ears. Patient ok with plan    Again Discussed risks of the procedure including hematoma, seroma, infection, eye injury, lagophthalmos, ectropion, dry eyes, scarring, asymmetry, eye muscle injury, loss of vision, corneal abrasion, recurrence of deformity    need to stop ASA 2 weeks prior to OR  recent cardiac clearance, no further cardiac clearance needed    To OR for extended brachioplasty          Felipe Ledesma MD  09/30/24  07:09 EDT        "

## 2024-09-30 NOTE — OP NOTE
Patient Name:  Amanda Chavez  :  1965  MRN:  [id number]    Date of Surgery:  2024     Pre Operative Diagnosis: excess skin arms  Post Operative Diagnosis:   excess skin arms      PROCEDURES PERFORMED:  1) bilateral extended brachioplasty    SURGEON: Felipe Ledesma MD      CPT® Codes:         Staff:  Surgeon(s):  Felipe Ledesma MD    Circulator: Angus Logan RN; Yasmeen Siddiqui RN  Physician Assistant: Nilsa Yancey PA  Scrub Person: Cindi Rosa; Rodolfo Davison  Nursing Assistant: Leena Gan           Anesthesia: General      Indications for the Procedure:  Amanda Chavez is a 59yoF nonsmoker hx of heart murmur, breast cancer with treatment and breast reconstruction with excess skin arms      Description of the Procedure:      Patient was seen in preop risks and benefits were discussed with the patient who elected to proceed with the procedure. H&P was updated, consent was obtained, and patient was marked appropriately. Patient was then taken back to the OR placed in a supine position. Anesthesia was induced and the patient was then intubated. The patient was the prepped and draped in a sterile fashion. A time out was performed in which the patient and the procedure were gone over. Everyone was in agreement in terms of the patient and the procedure.      The patient's arms were then tailor tacked and marked using a marker and a stapler down to the axilla curving into the old breast scare. The staples were taken out the outline for planned excision was marked. Local was placed. Attention then went to resection. A 10 blade was used to score the pattern bilaterally. A bovie cautery was used to excise the excess skin in a subcutaneous plain. Hemostasis was noted. The skin was tailored tacked together. A 15 Chinese drain was placed in both arms. The drain was sutured in place with a 3-0 nylon.     The arm incisions were closed with 2-0 vicryl deep sutures followed by N sorb. A 3-0 strattafix  was run subcuticular. Xeofin dressing was placed followed by dressing wrap.      At this point in time the procedure had finished. Patient was then extubated and taken to PACU for full recovery.       Implants:  None  Implant Name Type Inv. Item Serial No.  Lot No. LRB No. Used Action   DEV CONTRL TISS STRATAFIX SPIRAL MNCRYL UD 3/0 PLS 60CM - [id number] Implant DEV CONTRL TISS STRATAFIX SPIRAL MNCRYL UD 3/0 PLS 60CM  [employer]  DIV OF J AND J [id number] N/A 1 Implanted   DEV CONTRL TISS STRATAFIX SPIRAL MNCRYL UD 3/0 PLS 60CM - [id number] Implant DEV CONTRL TISS STRATAFIX SPIRAL MNCRYL UD 3/0 PLS 60CM  [employer]  DIV OF J AND J [id number] N/A 1 Implanted       Specimen Removed:   None    Estimated Blood Loss: minimal    Drains Placed: One drain placed in each arm, total of 2 drains    Complications: none immediate      Felipe Ledesma MD     Date: 9/30/2024  Time: 11:23 EDT

## 2024-09-30 NOTE — DISCHARGE INSTRUCTIONS
Keep dressing in place  No showers or baths  Ambulate  Drain care  Follow up as scheduled tomorrow

## 2024-09-30 NOTE — ANESTHESIA PROCEDURE NOTES
Airway  Urgency: elective    Date/Time: 9/30/2024 7:38 AM  Airway not difficult    General Information and Staff    Patient location during procedure: OR  CRNA/CAA: Shanon Da Silva CRNA    Indications and Patient Condition  Indications for airway management: airway protection    Preoxygenated: yes  MILS not maintained throughout  Mask difficulty assessment: 1 - vent by mask    Final Airway Details  Final airway type: endotracheal airway      Successful airway: ETT  Cuffed: yes   Successful intubation technique: direct laryngoscopy  Facilitating devices/methods: intubating stylet  Endotracheal tube insertion site: oral  Blade: Johan  Blade size: 3  ETT size (mm): 7.0  Cormack-Lehane Classification: grade I - full view of glottis  Placement verified by: chest auscultation and capnometry   Inital cuff pressure (cm H2O): 6  Measured from: lips  ETT/EBT  to lips (cm): 21  Number of attempts at approach: 1  Assessment: lips, teeth, and gum same as pre-op and atraumatic intubation    Additional Comments  Negative epigastric sounds, Breath sound equal bilaterally with symmetric chest rise and fall

## (undated) DEVICE — SYS CLS SKIN PREMIERPRO EXOFINFUSION 22CM

## (undated) DEVICE — BANDAGE,GAUZE,BULKEE II,4.5"X4.1YD,STRL: Brand: MEDLINE

## (undated) DEVICE — PENCL SMOKE/EVAC MEGADYNE TELESCP 10FT

## (undated) DEVICE — STRIP,CLOSURE,WOUND,MEDI-STRIP,1/2X4: Brand: MEDLINE

## (undated) DEVICE — CLTH CLENS READYCLEANSE PERI CARE PK/5

## (undated) DEVICE — ADHS SKIN PREMIERPRO EXOFIN TOPICAL HI/VISC .5ML

## (undated) DEVICE — INTENDED FOR TISSUE SEPARATION, AND OTHER PROCEDURES THAT REQUIRE A SHARP SURGICAL BLADE TO PUNCTURE OR CUT.: Brand: BARD-PARKER ® STAINLESS STEEL BLADES

## (undated) DEVICE — JACKSON-PRATT 100CC BULB RESERVOIR: Brand: CARDINAL HEALTH

## (undated) DEVICE — ANTIBACTERIAL UNDYED BRAIDED (POLYGLACTIN 910), SYNTHETIC ABSORBABLE SUTURE: Brand: COATED VICRYL

## (undated) DEVICE — DRSNG SURESITE WNDW 4X4.5

## (undated) DEVICE — BLANKT WARM UNDER/BDY FUL/ACC A/ 90X206CM

## (undated) DEVICE — SUT ETHIB 0/0 MO6 I8IN CX45D

## (undated) DEVICE — SUT MNCRYL PLS ANTIB UD 3/0 PS2 27IN

## (undated) DEVICE — GLV SURG SENSICARE W/ALOE PF LF 7.5 STRL

## (undated) DEVICE — DRAPE,TOWEL,LARGE,INVISISHIELD: Brand: MEDLINE

## (undated) DEVICE — ELECTRD BLD EZ CLN MOD XLNG 2.75IN

## (undated) DEVICE — BNDR ABD PREMIUM/UNIV 10IN 27TO48IN

## (undated) DEVICE — IRRIGATOR BULB ASEPTO 60CC STRL

## (undated) DEVICE — PK CHST BRST 10

## (undated) DEVICE — BLAKE SILICONE DRAIN, 15 FR ROUND, HUBLESS WITH 3/16" TROCAR: Brand: BLAKE

## (undated) DEVICE — PROXIMATE RH ROTATING HEAD SKIN STAPLERS (35 WIDE) CONTAINS 35 STAINLESS STEEL STAPLES: Brand: PROXIMATE

## (undated) DEVICE — TBG SXN LIPECTOMY 108IN

## (undated) DEVICE — SUT GUT PLN FAST ABS 5/0 PC1 18IN 1915G

## (undated) DEVICE — STOCKINETTE,IMPERVIOUS,12X48,STERILE: Brand: MEDLINE

## (undated) DEVICE — BRA COMPR SURG STL958 LG

## (undated) DEVICE — TOTAL TRAY, 16FR 10ML SIL FOLEY, URN: Brand: MEDLINE

## (undated) DEVICE — GLV SURG SENSICARE PI MIC PF SZ7.5 LF STRL

## (undated) DEVICE — TRAP FLD MINIVAC MEGADYNE 100ML

## (undated) DEVICE — INTENDED USE FOR SURGICAL MARKING ON INTACT SKIN, ALSO PROVIDES A PERMANENT METHOD OF IDENTIFYING OBJECTS IN THE OPERATING ROOM: Brand: WRITESITE® REGULAR TIP SKIN MARKER

## (undated) DEVICE — BRA COMPR SURG STL958 XL

## (undated) DEVICE — DRSNG PAD ABD 8X10IN STRL

## (undated) DEVICE — APPL CHLORAPREP TINTED 26ML TEAL

## (undated) DEVICE — UNDERGLV SURG BIOGEL INDICAT PI SZ8 BLU

## (undated) DEVICE — SUT ETHLN 3/0 PC5 18IN 1893G

## (undated) DEVICE — SUT SILK 4/0 RB1CR8 18IN C054D

## (undated) DEVICE — PATIENT RETURN ELECTRODE, SINGLE-USE, CONTACT QUALITY MONITORING, ADULT, WITH 9FT CORD, FOR PATIENTS WEIGING OVER 33LBS. (15KG): Brand: MEGADYNE

## (undated) DEVICE — NDL FLTR BLNT 18G 1 1/2IN

## (undated) DEVICE — BNDG,ELSTC,MATRIX,STRL,6"X5YD,LF,HOOK&LP: Brand: MEDLINE

## (undated) DEVICE — SUT ETHIB 0 MO7 18IN CX41D

## (undated) DEVICE — SUT SILK 2/0 PS 18IN 1588H

## (undated) DEVICE — DRESSING,GAUZE,XEROFORM,CURAD,1"X8",ST: Brand: CURAD

## (undated) DEVICE — LINER CANSTR SXN HERCULES

## (undated) DEVICE — STPLR SKIN SUBCUTICULAR INSORB 2030

## (undated) DEVICE — HDRST POSTIN FM CRDL TRACH SLOT NONCOMRESS 9X8X4IN

## (undated) DEVICE — COVER,MAYO STAND,STERILE: Brand: MEDLINE

## (undated) DEVICE — SYRINGE,CONTROL,LL,FINGER,GRIP: Brand: MEDLINE INDUSTRIES, INC.

## (undated) DEVICE — 1 ML TUBERCULIN SYRINGE REGULAR TIP: Brand: MONOJECT

## (undated) DEVICE — ASPIRATION TUBING SET, DISPOSABLE: Brand: MICROAIRE®

## (undated) DEVICE — Device

## (undated) DEVICE — NDL HYPO ECLPS SFTY 30G 1/2IN

## (undated) DEVICE — SURGUARD3 SAFETY HYPODERMIC NEEDLE: Brand: SURGUARD3

## (undated) DEVICE — SUT MNCRYL 5/0 P3 18IN UD MCP493G

## (undated) DEVICE — DEV DEL BRST/IMP GEL KELLER2 FUNNEL EA/5

## (undated) DEVICE — SUT MNCRYL PLS ANTIB UD 4/0 PS2 18IN

## (undated) DEVICE — BNDG ELAS CO-FLEX SLF ADHR 4IN5YD LF STRL

## (undated) DEVICE — BIOPATCH™ ANTIMICROBIAL DRESSING WITH CHLORHEXIDINE GLUCONATE IS A HYDROPHILLIC POLYURETHANE ABSORPTIVE FOAM WITH CHLORHEXIDINE GLUCONATE (CHG) WHICH INHIBITS BACTERIAL GROWTH UNDER THE DRESSING. THE DRESSING IS INTENDED TO BE USED TO ABSORB EXUDATE, COVER A WOUND CAUSED BY VASCULAR AND NONVASCULAR PERCUTANEOUS MEDICAL DEVICES DURING SURGERY, AS WELL AS REDUCE LOCAL INFECTION AND COLONIZATION OF MICROORGANISMS.: Brand: BIOPATCH